# Patient Record
Sex: FEMALE | Race: BLACK OR AFRICAN AMERICAN | Employment: UNEMPLOYED | URBAN - METROPOLITAN AREA
[De-identification: names, ages, dates, MRNs, and addresses within clinical notes are randomized per-mention and may not be internally consistent; named-entity substitution may affect disease eponyms.]

---
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
## 2020-05-25 ENCOUNTER — HOSPITAL ENCOUNTER (EMERGENCY)
Age: 20
Discharge: HOME | End: 2020-05-25
Attending: EMERGENCY MEDICINE
Payer: MEDICAID

## 2020-05-25 VITALS
WEIGHT: 215 LBS | SYSTOLIC BLOOD PRESSURE: 119 MMHG | HEART RATE: 89 BPM | OXYGEN SATURATION: 98 % | RESPIRATION RATE: 16 BRPM | BODY MASS INDEX: 42.21 KG/M2 | TEMPERATURE: 98.7 F | DIASTOLIC BLOOD PRESSURE: 88 MMHG | HEIGHT: 60 IN

## 2020-05-25 DIAGNOSIS — L02.31 ABSCESS OF RIGHT BUTTOCK: Primary | ICD-10-CM

## 2020-05-25 PROCEDURE — 99283 EMERGENCY DEPT VISIT LOW MDM: CPT

## 2020-05-25 RX ORDER — SULFAMETHOXAZOLE AND TRIMETHOPRIM 800; 160 MG/1; MG/1
1 TABLET ORAL 2 TIMES DAILY
Qty: 14 TAB | Refills: 0 | Status: SHIPPED | OUTPATIENT
Start: 2020-05-25 | End: 2020-06-01

## 2020-05-25 NOTE — DISCHARGE INSTRUCTIONS

## 2020-05-25 NOTE — ED TRIAGE NOTES
Pt ambulatory to triage wearing mask. Pt reports abscess to right groin area. Pt states it popped and drained last but still reports it being large and painful. Pt also reports a second one under her stomach. Pt reports hx of abscesses.

## 2020-05-25 NOTE — ED NOTES
I have reviewed discharge instructions with the patient. The patient verbalized understanding. Patient left ED via Discharge Method: ambulatory to Home with self. Opportunity for questions and clarification provided. Patient given 1 scripts. No e-sign. To continue your aftercare when you leave the hospital, you may receive an automated call from our care team to check in on how you are doing. This is a free service and part of our promise to provide the best care and service to meet your aftercare needs.  If you have questions, or wish to unsubscribe from this service please call [phone]. Thank you for Choosing our [facility] Emergency Department.

## 2020-05-25 NOTE — ED PROVIDER NOTES
80-year-old -American female with history of abscesses presents with suspected abscess inner aspect of her right buttock. She states she first noticed it 2 or 3 days ago. It is already draining. Subjective fever but no measured temperature. No vomiting. The history is provided by the patient. Abscess           Past Medical History:   Diagnosis Date    Ill-defined condition     skin condtion       Past Surgical History:   Procedure Laterality Date    HX GI      renasan with 2 G-tubes-removed         No family history on file.     Social History     Socioeconomic History    Marital status: SINGLE     Spouse name: Not on file    Number of children: Not on file    Years of education: Not on file    Highest education level: Not on file   Occupational History    Not on file   Social Needs    Financial resource strain: Not on file    Food insecurity     Worry: Not on file     Inability: Not on file    Transportation needs     Medical: Not on file     Non-medical: Not on file   Tobacco Use    Smoking status: Never Smoker   Substance and Sexual Activity    Alcohol use: No    Drug use: Not on file    Sexual activity: Not on file   Lifestyle    Physical activity     Days per week: Not on file     Minutes per session: Not on file    Stress: Not on file   Relationships    Social connections     Talks on phone: Not on file     Gets together: Not on file     Attends Taoist service: Not on file     Active member of club or organization: Not on file     Attends meetings of clubs or organizations: Not on file     Relationship status: Not on file    Intimate partner violence     Fear of current or ex partner: Not on file     Emotionally abused: Not on file     Physically abused: Not on file     Forced sexual activity: Not on file   Other Topics Concern    Not on file   Social History Narrative    Not on file         ALLERGIES: Latex and Petrolatum [white petrolatum]    Review of Systems Constitutional: Positive for fever. Respiratory: Negative for shortness of breath. Gastrointestinal: Negative for vomiting. Skin: Positive for rash. Neurological: Negative for headaches. Vitals:    05/25/20 1256   BP: 119/88   Pulse: 89   Resp: 16   Temp: 98.7 °F (37.1 °C)   SpO2: 98%   Weight: 97.5 kg (215 lb)   Height: 5' (1.524 m)            Physical Exam  Vitals signs and nursing note reviewed. Constitutional:       General: She is not in acute distress. Appearance: Normal appearance. She is not toxic-appearing. HENT:      Head: Normocephalic. Mouth/Throat:      Mouth: Mucous membranes are moist.   Neck:      Musculoskeletal: Normal range of motion. Cardiovascular:      Rate and Rhythm: Normal rate. Pulmonary:      Effort: Pulmonary effort is normal.   Skin:     General: Skin is warm and dry. Comments: Inner aspect of the right buttock has a small opening with spontaneous purulent drainage. There is no palpable fluid collection. Neurological:      Mental Status: She is alert. Psychiatric:         Mood and Affect: Mood normal.         Behavior: Behavior normal.          MDM  Number of Diagnoses or Management Options  Diagnosis management comments: Patient does have a abscess but it appears to be spontaneously draining therefore I&D not needed. Will place on Bactrim.     Risk of Complications, Morbidity, and/or Mortality  Presenting problems: low  Diagnostic procedures: low  Management options: low           Procedures

## 2021-01-04 ENCOUNTER — HOSPITAL ENCOUNTER (EMERGENCY)
Age: 21
Discharge: LEFT BEFORE DISCHARGE | End: 2021-01-04
Attending: EMERGENCY MEDICINE
Payer: MEDICAID

## 2021-01-04 VITALS
HEIGHT: 60 IN | HEART RATE: 88 BPM | OXYGEN SATURATION: 97 % | WEIGHT: 200 LBS | RESPIRATION RATE: 18 BRPM | TEMPERATURE: 98.1 F | BODY MASS INDEX: 39.27 KG/M2 | DIASTOLIC BLOOD PRESSURE: 76 MMHG | SYSTOLIC BLOOD PRESSURE: 126 MMHG

## 2021-01-04 PROCEDURE — 75810000275 HC EMERGENCY DEPT VISIT NO LEVEL OF CARE

## 2021-01-04 NOTE — ED NOTES
md has not been able to find pt in room and sitter reports pt has not been seen in room since assessment by this rn. Charge rn notified.

## 2021-01-04 NOTE — ED TRIAGE NOTES
Pt arrived via POV wearing a mask c/o \"knot\" on the right and left side of her groin area X3 days. Reports hx of same.  Denies fever, NVD, urinary s/s, shob, cp, meds pta

## 2022-01-03 PROBLEM — H52.223 MYOPIA OF BOTH EYES WITH REGULAR ASTIGMATISM: Status: ACTIVE | Noted: 2017-12-22

## 2022-01-03 PROBLEM — H52.13 MYOPIA OF BOTH EYES WITH REGULAR ASTIGMATISM: Status: ACTIVE | Noted: 2017-12-22

## 2022-01-03 PROBLEM — H53.023: Status: ACTIVE | Noted: 2017-12-22

## 2022-01-03 PROBLEM — E55.9 VITAMIN D DEFICIENCY: Status: ACTIVE | Noted: 2018-10-12

## 2022-01-03 PROBLEM — H53.8 OTHER VISUAL DISTURBANCES: Status: ACTIVE | Noted: 2017-12-22

## 2022-03-19 PROBLEM — H52.13 MYOPIA OF BOTH EYES WITH REGULAR ASTIGMATISM: Status: ACTIVE | Noted: 2017-12-22

## 2022-03-19 PROBLEM — H53.8 OTHER VISUAL DISTURBANCES: Status: ACTIVE | Noted: 2017-12-22

## 2022-03-19 PROBLEM — H53.023: Status: ACTIVE | Noted: 2017-12-22

## 2022-03-19 PROBLEM — H52.223 MYOPIA OF BOTH EYES WITH REGULAR ASTIGMATISM: Status: ACTIVE | Noted: 2017-12-22

## 2022-03-19 PROBLEM — E55.9 VITAMIN D DEFICIENCY: Status: ACTIVE | Noted: 2018-10-12

## 2022-03-24 NOTE — H&P (VIEW-ONLY)
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 Proceed with Achilles lengthening. Popliteal saphenous block by anesthesia -supine. CC: Follow-up (Pre op Surgery is next Wednesday March 30thmm she states she rolled her ankle 2 days ago no treatment )       HPI: Frantz George is a 24 y.o. female who presents with Follow-up (Pre op Surgery is next Wednesday March 30thmm she states she rolled her ankle 2 days ago no treatment )  . She states for a long period of her life she did not treat her bullosa appropriately, specifically the plantar aspect of her left foot. She has systemic skin lesions. Most severe in the plantar aspect of her left foot. This was resulted in her toe walking for multiple years. It is now causing anterior ankle pain along with an Achilles contracture. She is somewhat partner Dr. Tommy Cannon who tried to treat her conservatively with physical therapy. However therapy is increasing her plantar pain. The boot that she was in was irritating her skin so the dermatologist recommended she not be in the boot. We had discussed surgery however I want to get more input from wound care and her dermatologist.  I have since spoken with a dermatologist.  It is outlined in the bottom of the note. She recently rolled her ankle and wants me to evaluate it as well. This ankle rolling is a new injury. History was obtained by patient    ROS/Meds/PSH/PMH/FH/SH: I personally reviewed the patients standard intake form. Below are the pertinents    Tobacco:  reports that she has never smoked. She has never used smokeless tobacco.  Diabetes: None No results found for: HBA1C, UQS2MAMX, LGC8PXSV, [id number]    Other: Epidermolysis bullosa    Physical Examination:  left lower: 2+ dp. +silt s/s/sp/dp/t. 5/5 strength to FHL/EHL/FDL/EDL/AT/PT/Nighat/Achilles. They are TTP at plantar aspect of her foot.   She is also tender to palpation at the distal fibula region. Obvious plantar wounds multiple in the bottom aspect. None of them full-thickness. Most of them of healed or scabbed but there are couple that a small granulating base. .  She also has some tenderness in the front of her ankle. She has a melody Achilles contracture. She is able to dorsiflex her ankle at approximately 15 degrees of plantarflexion. She is unable to the neutral.  This is not improved with knee flexion or extension. Imaging:   I independently interpreted XR taken today    X-Ray LEFT Ankle 3 vw (AP/Lateral/Oblique) for ankle pain   Findings: No signs of acute fractures or dislocations. There is a mild amount of hindfoot valgus noted. Some mild dysmorphic features noted throughout the hindfoot. Heel does not touch the ground she stands with equinus. Second hammertoe noted. Impression: Equinus with second hammertoe   Signature: Yaya Beard MD         Assessment:   Left Achilles contracture    Plan:   4 This is a chronic illness/condition with exacerbation and progression  Treatment at this time: Elective major surgery with procedural risk factors    At her last appointment we had a long discussion about wound healing, chronic wounds, and issues with her epidermolysis bullosa. She recounts and understands the entire discussion as documented on prior notes. She like to proceed with surgery. Surgery will be left Achilles lengthening. I have explained the risk of Achilles rupture, wound healing complications, chronic pain, and recontracture. She understands the risk and elected to proceed with surgery. She understands that she will splint her foot. She cannot remove it. That I will see her back 1 week out from surgery and begin local wound care at that time. She understands this and the nonweightbearing restrictions after surgery. I discussed with her dermatologist Dr. Tatianna Guevara the risk.   He states that she will be fine from a healing standpoint. He recommends that we use gentamicin ointment. Therefore I will place gentamicin ointment on the wounds postoperatively and I will be in charge of dressing changes and wound care with gentamicin ointment. Do the gentamicin helping a mutation transcription, we do recommend that it be used.

## 2022-03-28 RX ORDER — DOXYCYCLINE 100 MG/1
100 CAPSULE ORAL 2 TIMES DAILY
COMMUNITY
Start: 2022-03-03

## 2022-03-28 RX ORDER — MUPIROCIN 20 MG/G
OINTMENT TOPICAL
COMMUNITY
Start: 2022-02-01

## 2022-03-28 RX ORDER — DEXAMETHASONE 6 MG/1
TABLET ORAL
COMMUNITY
Start: 2022-02-11

## 2022-03-28 RX ORDER — BROMPHENIRAMINE MALEATE, PSEUDOEPHEDRINE HYDROCHLORIDE, AND DEXTROMETHORPHAN HYDROBROMIDE 2; 30; 10 MG/5ML; MG/5ML; MG/5ML
SYRUP ORAL
COMMUNITY
Start: 2022-02-11

## 2022-03-29 ENCOUNTER — ANESTHESIA EVENT (OUTPATIENT)
Dept: SURGERY | Age: 22
End: 2022-03-29
Payer: MEDICAID

## 2022-03-30 ENCOUNTER — ANESTHESIA (OUTPATIENT)
Dept: SURGERY | Age: 22
End: 2022-03-30
Payer: MEDICAID

## 2022-03-30 ENCOUNTER — HOSPITAL ENCOUNTER (OUTPATIENT)
Age: 22
Setting detail: OUTPATIENT SURGERY
Discharge: HOME | End: 2022-03-30
Attending: ORTHOPAEDIC SURGERY | Admitting: ORTHOPAEDIC SURGERY
Payer: MEDICAID

## 2022-03-30 VITALS
RESPIRATION RATE: 16 BRPM | SYSTOLIC BLOOD PRESSURE: 111 MMHG | DIASTOLIC BLOOD PRESSURE: 67 MMHG | WEIGHT: 230 LBS | OXYGEN SATURATION: 99 % | HEART RATE: 68 BPM | BODY MASS INDEX: 44.92 KG/M2 | TEMPERATURE: 99 F

## 2022-03-30 DIAGNOSIS — M67.02 CONTRACTURE OF LEFT ACHILLES TENDON: ICD-10-CM

## 2022-03-30 LAB — HCG UR QL: NEGATIVE

## 2022-03-30 PROCEDURE — 74011000250 HC RX REV CODE- 250: Performed by: REGISTERED NURSE

## 2022-03-30 PROCEDURE — 74011250636 HC RX REV CODE- 250/636: Performed by: REGISTERED NURSE

## 2022-03-30 PROCEDURE — 76210000063 HC OR PH I REC FIRST 0.5 HR: Performed by: ORTHOPAEDIC SURGERY

## 2022-03-30 PROCEDURE — 76942 ECHO GUIDE FOR BIOPSY: CPT | Performed by: ORTHOPAEDIC SURGERY

## 2022-03-30 PROCEDURE — 74011250637 HC RX REV CODE- 250/637: Performed by: ANESTHESIOLOGY

## 2022-03-30 PROCEDURE — 76010010054 HC POST OP PAIN BLOCK: Performed by: ORTHOPAEDIC SURGERY

## 2022-03-30 PROCEDURE — 74011250636 HC RX REV CODE- 250/636: Performed by: ORTHOPAEDIC SURGERY

## 2022-03-30 PROCEDURE — 77030003602 HC NDL NRV BLK BBMI -B: Performed by: ANESTHESIOLOGY

## 2022-03-30 PROCEDURE — 81025 URINE PREGNANCY TEST: CPT

## 2022-03-30 PROCEDURE — 74011250636 HC RX REV CODE- 250/636: Performed by: ANESTHESIOLOGY

## 2022-03-30 PROCEDURE — 27685 REVISION OF LOWER LEG TENDON: CPT | Performed by: ORTHOPAEDIC SURGERY

## 2022-03-30 PROCEDURE — 76210000020 HC REC RM PH II FIRST 0.5 HR: Performed by: ORTHOPAEDIC SURGERY

## 2022-03-30 PROCEDURE — 2709999900 HC NON-CHARGEABLE SUPPLY: Performed by: ORTHOPAEDIC SURGERY

## 2022-03-30 PROCEDURE — 77030000032 HC CUF TRNQT ZIMM -B: Performed by: ORTHOPAEDIC SURGERY

## 2022-03-30 PROCEDURE — 76010000159 HC OR TIME FIRST 0.5 HR INTENSV-TIER 1: Performed by: ORTHOPAEDIC SURGERY

## 2022-03-30 PROCEDURE — 77030025281 HC SPLNT ORTHGLS 1 BSNM -B: Performed by: ORTHOPAEDIC SURGERY

## 2022-03-30 PROCEDURE — 76060000032 HC ANESTHESIA 0.5 TO 1 HR: Performed by: ORTHOPAEDIC SURGERY

## 2022-03-30 RX ORDER — SODIUM CHLORIDE, SODIUM LACTATE, POTASSIUM CHLORIDE, CALCIUM CHLORIDE 600; 310; 30; 20 MG/100ML; MG/100ML; MG/100ML; MG/100ML
75 INJECTION, SOLUTION INTRAVENOUS CONTINUOUS
Status: DISCONTINUED | OUTPATIENT
Start: 2022-03-30 | End: 2022-03-30 | Stop reason: HOSPADM

## 2022-03-30 RX ORDER — LIDOCAINE HYDROCHLORIDE 10 MG/ML
0.1 INJECTION INFILTRATION; PERINEURAL AS NEEDED
Status: DISCONTINUED | OUTPATIENT
Start: 2022-03-30 | End: 2022-03-30 | Stop reason: HOSPADM

## 2022-03-30 RX ORDER — FLUMAZENIL 0.1 MG/ML
0.2 INJECTION INTRAVENOUS
Status: DISCONTINUED | OUTPATIENT
Start: 2022-03-30 | End: 2022-03-30 | Stop reason: HOSPADM

## 2022-03-30 RX ORDER — FENTANYL CITRATE 50 UG/ML
100 INJECTION, SOLUTION INTRAMUSCULAR; INTRAVENOUS
Status: COMPLETED | OUTPATIENT
Start: 2022-03-30 | End: 2022-03-30

## 2022-03-30 RX ORDER — CEFAZOLIN SODIUM/WATER 2 G/20 ML
2 SYRINGE (ML) INTRAVENOUS ONCE
Status: COMPLETED | OUTPATIENT
Start: 2022-03-30 | End: 2022-03-30

## 2022-03-30 RX ORDER — ACETAMINOPHEN 500 MG
1000 TABLET ORAL ONCE
Status: COMPLETED | OUTPATIENT
Start: 2022-03-30 | End: 2022-03-30

## 2022-03-30 RX ORDER — ONDANSETRON 2 MG/ML
INJECTION INTRAMUSCULAR; INTRAVENOUS AS NEEDED
Status: DISCONTINUED | OUTPATIENT
Start: 2022-03-30 | End: 2022-03-30 | Stop reason: HOSPADM

## 2022-03-30 RX ORDER — MIDAZOLAM HYDROCHLORIDE 1 MG/ML
2 INJECTION, SOLUTION INTRAMUSCULAR; INTRAVENOUS
Status: COMPLETED | OUTPATIENT
Start: 2022-03-30 | End: 2022-03-30

## 2022-03-30 RX ORDER — PROPOFOL 10 MG/ML
INJECTION, EMULSION INTRAVENOUS AS NEEDED
Status: DISCONTINUED | OUTPATIENT
Start: 2022-03-30 | End: 2022-03-30 | Stop reason: HOSPADM

## 2022-03-30 RX ORDER — SODIUM CHLORIDE, SODIUM LACTATE, POTASSIUM CHLORIDE, CALCIUM CHLORIDE 600; 310; 30; 20 MG/100ML; MG/100ML; MG/100ML; MG/100ML
100 INJECTION, SOLUTION INTRAVENOUS CONTINUOUS
Status: DISCONTINUED | OUTPATIENT
Start: 2022-03-30 | End: 2022-03-30 | Stop reason: HOSPADM

## 2022-03-30 RX ORDER — SODIUM CHLORIDE 0.9 % (FLUSH) 0.9 %
5-40 SYRINGE (ML) INJECTION EVERY 8 HOURS
Status: DISCONTINUED | OUTPATIENT
Start: 2022-03-30 | End: 2022-03-30 | Stop reason: HOSPADM

## 2022-03-30 RX ORDER — LIDOCAINE HYDROCHLORIDE 20 MG/ML
INJECTION, SOLUTION EPIDURAL; INFILTRATION; INTRACAUDAL; PERINEURAL AS NEEDED
Status: DISCONTINUED | OUTPATIENT
Start: 2022-03-30 | End: 2022-03-30 | Stop reason: HOSPADM

## 2022-03-30 RX ORDER — OXYCODONE HYDROCHLORIDE 5 MG/1
5 TABLET ORAL
Status: COMPLETED | OUTPATIENT
Start: 2022-03-30 | End: 2022-03-30

## 2022-03-30 RX ORDER — NALOXONE HYDROCHLORIDE 0.4 MG/ML
0.1 INJECTION, SOLUTION INTRAMUSCULAR; INTRAVENOUS; SUBCUTANEOUS
Status: DISCONTINUED | OUTPATIENT
Start: 2022-03-30 | End: 2022-03-30 | Stop reason: HOSPADM

## 2022-03-30 RX ORDER — HALOPERIDOL 5 MG/ML
1 INJECTION INTRAMUSCULAR
Status: DISCONTINUED | OUTPATIENT
Start: 2022-03-30 | End: 2022-03-30 | Stop reason: HOSPADM

## 2022-03-30 RX ORDER — DIPHENHYDRAMINE HYDROCHLORIDE 50 MG/ML
12.5 INJECTION, SOLUTION INTRAMUSCULAR; INTRAVENOUS
Status: DISCONTINUED | OUTPATIENT
Start: 2022-03-30 | End: 2022-03-30 | Stop reason: HOSPADM

## 2022-03-30 RX ORDER — HYDROMORPHONE HYDROCHLORIDE 2 MG/ML
0.5 INJECTION, SOLUTION INTRAMUSCULAR; INTRAVENOUS; SUBCUTANEOUS
Status: DISCONTINUED | OUTPATIENT
Start: 2022-03-30 | End: 2022-03-30 | Stop reason: HOSPADM

## 2022-03-30 RX ORDER — SODIUM CHLORIDE 0.9 % (FLUSH) 0.9 %
5-40 SYRINGE (ML) INJECTION AS NEEDED
Status: DISCONTINUED | OUTPATIENT
Start: 2022-03-30 | End: 2022-03-30 | Stop reason: HOSPADM

## 2022-03-30 RX ORDER — PROPOFOL 10 MG/ML
INJECTION, EMULSION INTRAVENOUS
Status: DISCONTINUED | OUTPATIENT
Start: 2022-03-30 | End: 2022-03-30 | Stop reason: HOSPADM

## 2022-03-30 RX ADMIN — ONDANSETRON 4 MG: 2 INJECTION INTRAMUSCULAR; INTRAVENOUS at 07:22

## 2022-03-30 RX ADMIN — FENTANYL CITRATE 100 MCG: 50 INJECTION INTRAMUSCULAR; INTRAVENOUS at 06:49

## 2022-03-30 RX ADMIN — ACETAMINOPHEN 1000 MG: 500 TABLET, FILM COATED ORAL at 05:53

## 2022-03-30 RX ADMIN — ROPIVACAINE HYDROCHLORIDE 22 ML: 5 INJECTION, SOLUTION EPIDURAL; INFILTRATION; PERINEURAL at 06:45

## 2022-03-30 RX ADMIN — MIDAZOLAM 2 MG: 1 INJECTION INTRAMUSCULAR; INTRAVENOUS at 06:49

## 2022-03-30 RX ADMIN — MIDAZOLAM 2 MG: 1 INJECTION INTRAMUSCULAR; INTRAVENOUS at 06:55

## 2022-03-30 RX ADMIN — EPINEPHRINE 5 ML: 1 INJECTION, SOLUTION, CONCENTRATE INTRAVENOUS at 06:50

## 2022-03-30 RX ADMIN — Medication 2 G: at 07:24

## 2022-03-30 RX ADMIN — PROPOFOL 50 MG: 10 INJECTION, EMULSION INTRAVENOUS at 07:22

## 2022-03-30 RX ADMIN — LIDOCAINE HYDROCHLORIDE 60 MG: 20 INJECTION, SOLUTION EPIDURAL; INFILTRATION; INTRACAUDAL; PERINEURAL at 07:22

## 2022-03-30 RX ADMIN — OXYCODONE 5 MG: 5 TABLET ORAL at 08:56

## 2022-03-30 RX ADMIN — EPINEPHRINE 8 ML: 1 INJECTION, SOLUTION, CONCENTRATE INTRAVENOUS at 06:45

## 2022-03-30 RX ADMIN — PROPOFOL 120 MCG/KG/MIN: 10 INJECTION, EMULSION INTRAVENOUS at 07:22

## 2022-03-30 RX ADMIN — ROPIVACAINE HYDROCHLORIDE 15 ML: 5 INJECTION, SOLUTION EPIDURAL; INFILTRATION; PERINEURAL at 06:50

## 2022-03-30 RX ADMIN — SODIUM CHLORIDE, POTASSIUM CHLORIDE, SODIUM LACTATE AND CALCIUM CHLORIDE 100 ML/HR: 600; 310; 30; 20 INJECTION, SOLUTION INTRAVENOUS at 06:01

## 2022-03-30 NOTE — INTERVAL H&P NOTE
Update History & Physical    The Patient's History and Physical was reviewed   I discussed the surgery and patients medical condition with the patient. The chart was reviewed with the patient and I examined the patient. There was no change. The surgical site was confirmed by the patient and me. CV: RRR  RESP: CTAB    Plan:  The risk, benefits, expected outcome, and alternative to the recommended procedure have been discussed with the patient. Patient understands and wants to proceed with the procedure.     Electronically signed by Elizabeth Pierce MD on 03/30/22 6:58 AM

## 2022-03-30 NOTE — ANESTHESIA PREPROCEDURE EVALUATION
Anesthetic History   No history of anesthetic complications            Review of Systems / Medical History  Patient summary reviewed and pertinent labs reviewed    Pulmonary            Asthma : well controlled       Neuro/Psych         Psychiatric history (Depression)     Cardiovascular    Hypertension              Exercise tolerance: >4 METS     GI/Hepatic/Renal     GERD: well controlled           Endo/Other        Morbid obesity     Other Findings   Comments: Chronic Pain  Epidermolysis bullosa           Physical Exam    Airway  Mallampati: III  TM Distance: > 6 cm  Neck ROM: normal range of motion   Mouth opening: Normal     Cardiovascular  Regular rate and rhythm,  S1 and S2 normal,  no murmur, click, rub, or gallop          Pertinent negatives: No murmur   Dental    Dentition: Poor dentition     Pulmonary  Breath sounds clear to auscultation               Abdominal  GI exam deferred       Other Findings            Anesthetic Plan    ASA: 3  Anesthesia type: total IV anesthesia      Post-op pain plan if not by surgeon: peripheral nerve block single    Induction: Intravenous  Anesthetic plan and risks discussed with: Patient and Family

## 2022-03-30 NOTE — NURSE NAVIGATOR
Pt received from OR on NC. Tolerating RA. Mitchel [place] at bedside, discharge instructions reviewed and questions answered. Medicated for pain x1. VSS. No further needs at this time.

## 2022-03-30 NOTE — ANESTHESIA PROCEDURE NOTES
Peripheral Block    Start time: 3/30/2022 6:42 AM  End time: 3/30/2022 6:45 AM  Performed by: Barby Hauser MD  Authorized by: Barby Hauser MD       Pre-procedure: Indications: at surgeon's request and post-op pain management    Preanesthetic Checklist: patient identified, risks and benefits discussed, site marked, timeout performed, anesthesia consent given and patient being monitored    Timeout Time: 06:41 EDT          Block Type:   Block Type:  Popliteal  Laterality:  Left  Monitoring:  Standard ASA monitoring, continuous pulse ox, frequent vital sign checks, heart rate, responsive to questions and oxygen  Injection Technique:  Single shot  Procedures: ultrasound guided and nerve stimulator    Patient Position: supine  Prep: chlorhexidine    Location:  Lower thigh  Needle Type:  Stimuplex  Needle Gauge:  22 G  Needle Localization:  Anatomical landmarks, ultrasound guidance and nerve stimulator  Motor Response comment:   Motor Response: minimal motor response >0.4 mA   Medication Injected:  Ropivacaine 0.5% with epinephrine 1:200,000 injection, 22 mL (Mixture components: EPINEPHrine HCl (PF) 1 mg/mL (1 mL) Soln, . 005 mL; ropivacaine (PF) 5 mg/mL (0.5 %) Soln, 1 mL)  mepivacaine 1.5% with epinephrine 1:200,000 injection, 8 mL (Mixture components: EPINEPHrine HCl (PF) 1 mg/mL (1 mL) Soln, . 005 mL; mepivacaine-pf 15 mg/mL (1.5 %) Soln, 1 mL)  Med Admin Time: 3/30/2022 6:45 AM    Assessment:  Number of attempts:  1  Injection Assessment:  Incremental injection every 5 mL, local visualized surrounding nerve on ultrasound, negative aspiration for CSF, negative aspiration for blood, no paresthesia, no intravascular symptoms and ultrasound image on chart  Patient tolerance:  Patient tolerated the procedure well with no immediate complications

## 2022-03-30 NOTE — DISCHARGE INSTRUCTIONS
INSTRUCTIONS FOLLOWING FOOT SURGERY    ACTIVITY  Elevate foot. No Ice    No weight bearing. Use crutches or knee walker until seen in follow up appointment        Blood clot prevention:    As instructed by Dr Jay Gill: Take 81mg aspirin twice daily if ok with your medical doctor and you have no GI Ulcer. Get up and out of bed frequently. While in bed move the legs as much as possible. DRESSING CARE Keep dry and in place until follow up appointment. Cover with cast bag or plastic bag when showering. Let the office know if dressing gets saturated with water. Don't put anything into the splint to relieve itching etc.     CALL YOUR DOCTOR IF YOU HAVE  Excessive bleeding that does not stop after holding mild pressure over the area. Temperature of 101 degrees or above. Redness, excessive swelling or bruising, and/or green or yellow, smelly discharge from incision. Loss of sensation - cold, white or blue toes. DIET  Day of Surgery: Clear liquids until no nausea or vomiting; then light, bland diet (Baked chicken, plain rice, grits, scrambled egg, toast). Nothing Greasy, fried or spicy today  Advance to regular diet on second day, unless your doctor orders otherwise. PAIN  Take pain medications as directed by your doctor. Call your doctor if pain is NOT relieved by medication. MEDICATION INTERACTION:  During your procedure you potentially received a medication or medications which may reduce the effectiveness of oral contraceptives. Please consider other forms of contraception for 1 month following your procedure if you are currently using oral contraceptives as your primary form of birth control.  In addition to this, we recommend continuing your oral contraceptive as prescribed, unless otherwise instructed by your physician, during this time    After general anesthesia or intravenous sedation, for 24 hours or while taking prescription Narcotics:  · Limit your activities  · A responsible adult needs to be with you for the next 24 hours  · Do not drive and operate hazardous machinery  · Do not make important personal or business decisions  · Do not drink alcoholic beverages  · If you have not urinated within 8 hours after discharge, and you are experiencing discomfort from urinary retention, please go to the nearest ED. · If you have sleep apnea and have a CPAP machine, please use it for all naps and sleeping. · Please use caution when taking narcotics and any of your home medications that may cause drowsiness. *  Please give a list of your current medications to your Primary Care Provider. *  Please update this list whenever your medications are discontinued, doses are      changed, or new medications (including over-the-counter products) are added. *  Please carry medication information at all times in case of emergency situations. These are general instructions for a healthy lifestyle:  No smoking/ No tobacco products/ Avoid exposure to second hand smoke  Surgeon General's Warning:  Quitting smoking now greatly reduces serious risk to your health. Obesity, smoking, and sedentary lifestyle greatly increases your risk for illness  A healthy diet, regular physical exercise & weight monitoring are important for maintaining a healthy lifestyle    You may be retaining fluid if you have a history of heart failure or if you experience any of the following symptoms:  Weight gain of 3 pounds or more overnight or 5 pounds in a week, increased swelling in our hands or feet or shortness of breath while lying flat in bed. Please call your doctor as soon as you notice any of these symptoms; do not wait until your next office visit. Learning About How to Use Crutches  Your Care Instructions  Crutches can help you walk when you have an injured hip, leg, knee, ankle, or foot. Your doctor will tell you how much weight--if any--you can put on your leg. Be sure your crutches fit you.  When you stand up in your normal posture, there should be space for two or three fingers between the top of the crutch and your armpit. When you let your hands hang down, the hand  should be at your wrists. When you put your hands on the hand , your elbows should be slightly bent. To stay safe when using crutches:  · Look straight ahead, not down at your feet. · Clear away small rugs, cords, or anything else that could cause you to trip, slip, or fall. · Be very careful around pets and small children. They can get in your path when you least expect it. · Be sure the rubber tips on your crutches are clean and in good condition to help prevent slipping. · Avoid slick conditions, such as wet floors and snowy or icy driveways. In bad weather, be especially careful on curbs and steps. How to use crutches  Getting ready to walk    1. Bend your elbows slightly. Press the padded top parts of the crutches against your sides, under your armpits. 2. If you have been told not to put any weight on your injured leg, keep that leg bent and off the ground. Walking with crutches    1. Put both crutches about 12 inches in front of you. 2. Put your weight on the handgrips, not on the pads under your arms. (Constant pressure against your underarms can cause numbness.) Swing your body forward. (If you have been told not to put any weight on your injured leg, keep that leg bent and off the ground.)  3. To complete the step, put your weight on the strong leg. 4. Move your crutches about 12 inches in front of you, and start the next step. 5. When you're confident using the crutches, you can move the crutches and your injured leg at the same time. Then push straight down on the crutches as you step past the crutches with your strong leg, as you would in normal walking. 6. Take small steps. 7. Use ramps and elevators when you can. Sitting down    1. To sit, back up to the chair.  Use one hand to hold both crutches by the handgrips, beside your injured leg. With the other hand, hold onto the seat and slowly lower yourself onto the chair. 2. Lay the crutches on the ground near your chair. If you prop them up, they may fall over. Getting up from a chair    1. To get up from a chair,  the crutches and put them in one hand beside your injured leg. 2. Put your weight on the handgrips of the crutches and on your strong leg to stand up. Walking up stairs    1. To go up stairs, step up with your strong leg and then bring the crutches and your injured leg to the upper step. 2. For stairs that have handrails: Put both crutches under the arm opposite the handrail. Use the hand opposite the handrail to hold both crutches by the handgrips. 3. Hold onto the handrail as you go up. Put your strong leg on the step first when you go up. Walking down stairs    1. To go down stairs, put your crutches and injured leg on the lower step. 2. Bring your strong leg to the lower step. This saying may help you remember: \"Up with the good, down with the bad. \"  3. For stairs that have handrails: Put both crutches under the arm opposite the handrail. Use the hand opposite the handrail to hold both crutches by the handgrips. Hold onto the handrail as you go down. Follow the same process you use for stairs: Lead with your crutches and injured leg on the way down.

## 2022-03-30 NOTE — ANESTHESIA PROCEDURE NOTES
Peripheral Block    Start time: 3/30/2022 6:48 AM  End time: 3/30/2022 6:50 AM  Performed by: Mirza Stallings MD  Authorized by: Mirza Stallings MD       Pre-procedure: Indications: at surgeon's request and post-op pain management    Preanesthetic Checklist: patient identified, risks and benefits discussed, site marked, timeout performed, anesthesia consent given and patient being monitored    Timeout Time: 06:46 EDT          Block Type:   Block Type: Adductor canal  Laterality:  Left  Monitoring:  Standard ASA monitoring, continuous pulse ox, frequent vital sign checks, heart rate, responsive to questions and oxygen  Injection Technique:  Single shot  Procedures: ultrasound guided    Patient Position: supine  Prep: chlorhexidine    Location:  Mid thigh  Needle Type:  Stimuplex  Needle Gauge:  22 G  Needle Localization:  Ultrasound guidance  Medication Injected:  Ropivacaine 0.5% with epinephrine 1:200,000 injection, 15 mL (Mixture components: EPINEPHrine HCl (PF) 1 mg/mL (1 mL) Soln, . 005 mL; ropivacaine (PF) 5 mg/mL (0.5 %) Soln, 1 mL)  mepivacaine 1.5% with epinephrine 1:200,000 injection, 5 mL (Mixture components: EPINEPHrine HCl (PF) 1 mg/mL (1 mL) Soln, . 005 mL; mepivacaine-pf 15 mg/mL (1.5 %) Soln, 1 mL)  Med Admin Time: 3/30/2022 6:50 AM    Assessment:  Number of attempts:  1  Injection Assessment:  Incremental injection every 5 mL, local visualized surrounding nerve on ultrasound, negative aspiration for CSF, negative aspiration for blood, no paresthesia, no intravascular symptoms and ultrasound image on chart  Patient tolerance:  Patient tolerated the procedure well with no immediate complications

## 2022-03-30 NOTE — OP NOTES
Operative Note    Robbi Meredith  MRN: [id number]    Date Of Surgery: 3/30/2022    Surgeon: Emilia Hassan MD    Assistant Surgeon: None    Procedure Performed:   Left achilles lengthening    Pre Op Diagnosis:  Left ankle equinus contracture    Post Op Diagnosis:   same    Implants:   * No implants in log *    Anesthesia:  MAC with preoperative popliteal saphenous block    Blood Loss:  -    Tourniquet:  Estimated - minutes    Pre Operative Abx:   Ancef 2g    Specimens/Cultures:  -    Significant Findings:  Healed epidermolysis bullosa blisters on the plantar aspect the foot. No signs of wounds at the Achilles. After release of the Achilles and lengthening it corrected the majority equinus however she still barely get to neutral ankle dorsiflexion. Pre Operative Course:  Alissa Paulson is a 24 y.o. 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 This was resulted in blisters in the plantar aspect of her foot. Therefore she has compensated by walking on her toes for multiple years. This in turn resulted in a equinus contracture. After discussion with her and her dermatologist, the decision was made for percutaneous Achilles lengthening. She understands the risk of increased surgery. Operation In Detail:  Patient was evaluated in the preoperative area. The left lower extremity was marked by me. We had a long discussion about the procedure and postoperative protocols. The patient was then brought back to the operating room suite and placed in the operating room table. A timeout was taken to identify the patient, procedure being performed, and laterality. After this the patient was prepped and draped in the normal sterile fashion using a Betadine solution and/or a ChloraPrep solution. A timeout was then taken to identify the patient his name, date of birth, laterality, and procedure being performed. We also identified allergies and any concerns about the operation.   Attention was then placed to the operative extremity. Antibiotics, 2 g IV Ancef were given    The leg was elevated. The Achilles tendon was identified. The foot was dorsiflexed. Using a 15 blade I made a incision at the mid substance of the Achilles near the insertion. I then took my knife, turned it medially, and casey transected the Achilles tendon. I then worked proximally and then 6 cm above the insertion. Made a separate incision, inserting a knife and mid substance of the Achilles, trunk and laterally, and performed a hemitransection of the Achilles tendon. I then worked approximately 10 cm above the Achilles insertion. I inserted my knife again. In the mid substance. I then hemitransected the medial  Achilles tendon. The Achilles tendon lengthening, and the foot was able to achieve a plantigrade position. I then  irrigated out the wounds. I closed each of them with a 3-0 nylon suture. I then placed gentamicin ointment on the wounds. A sterile dressing was then applied to the leg and Posterior slab splint. They were awoken from anesthesia and returned to the PACU without difficulty.     Post Operative Plan:   1- WB status: Non-Weight Bearing   2- Follow Up: 1 week  3- Immobilization/assistive devices: Crutches and knee scooter as needed  4- DVT px: ASA 81 mg BID  5- Pain Medication: Percocet 5mg/325 q6 PRN pain #30

## 2022-03-30 NOTE — PROGRESS NOTES
's pre-procedure visit and prayer with patient as requested.     Sb Griggs MDiv, BS  Board Certified

## 2022-03-30 NOTE — ANESTHESIA POSTPROCEDURE EVALUATION
Procedure(s):  LEFT ACHILLES LENGTHENING OPEN. total IV anesthesia    Anesthesia Post Evaluation      Multimodal analgesia: multimodal analgesia used between 6 hours prior to anesthesia start to PACU discharge  Patient location during evaluation: bedside  Patient participation: complete - patient participated  Level of consciousness: awake  Pain management: adequate  Airway patency: patent  Anesthetic complications: no  Cardiovascular status: acceptable  Respiratory status: spontaneous ventilation and acceptable  Hydration status: acceptable  Post anesthesia nausea and vomiting:  none      INITIAL Post-op Vital signs:   Vitals Value Taken Time   /70 03/30/22 0815   Temp 37.2 °C (99 °F) 03/30/22 0751   Pulse 63 03/30/22 0816   Resp 16 03/30/22 0756   SpO2 98 % 03/30/22 0816   Vitals shown include unvalidated device data.

## 2022-05-11 ENCOUNTER — HOSPITAL ENCOUNTER (EMERGENCY)
Age: 22
Discharge: HOME | End: 2022-05-11
Attending: EMERGENCY MEDICINE
Payer: MEDICAID

## 2022-05-11 VITALS
WEIGHT: 220 LBS | TEMPERATURE: 98.8 F | BODY MASS INDEX: 43.19 KG/M2 | HEIGHT: 60 IN | SYSTOLIC BLOOD PRESSURE: 147 MMHG | RESPIRATION RATE: 16 BRPM | DIASTOLIC BLOOD PRESSURE: 110 MMHG | HEART RATE: 60 BPM | OXYGEN SATURATION: 100 %

## 2022-05-11 DIAGNOSIS — L02.91 ABSCESS: Primary | ICD-10-CM

## 2022-05-11 PROCEDURE — 99283 EMERGENCY DEPT VISIT LOW MDM: CPT

## 2022-05-11 RX ORDER — AMOXICILLIN AND CLAVULANATE POTASSIUM 875; 125 MG/1; MG/1
1 TABLET, FILM COATED ORAL 2 TIMES DAILY
Qty: 14 TABLET | Refills: 0 | Status: SHIPPED | OUTPATIENT
Start: 2022-05-11

## 2022-05-11 NOTE — ED NOTES
26 yo female presenting today complaining of abscess to the right inner thigh that has been present for a week. States it started draining yesterday. Denies any fevers or chills. VSS. NAD. Patient evaluated initially in triage. Rapid Medical Evaluation was conducted and necessary orders have been placed. I have performed a medical screening exam.  Care will now be transferred to the provider in the back of the emergency department.   PETR Rodriguez 1:45 PM

## 2022-05-11 NOTE — ED PROVIDER NOTES
49-year-old female with a history of asthma, depression, hypertension, and obesity who presents emergency department today with complaint of an abscess to her right inner thigh. Patient states that she noticed it about a week ago and has progressively worsened. She reports a history of abscesses and states that she typically has to get placed on antibiotic. She states that she had 1 under her left arm about a month ago and took Bactrim for this. She denies any fever, chills, nausea, vomiting, diarrhea, chest pain, abdominal pain, or shortness of breath. She reports that the area is draining but denies any treatment. The history is provided by the patient. Abscess  This is a new problem. The current episode started more than 2 days ago. The problem occurs constantly. The problem has been gradually worsening. Pertinent negatives include no chest pain, no abdominal pain, no headaches and no shortness of breath. Nothing aggravates the symptoms. Nothing relieves the symptoms. She has tried nothing for the symptoms. Past Medical History:   Diagnosis Date    Asthma     uses inhalers     Chronic pain     Contracture of left Achilles tendon     Depression     Epidermolysis bullosa     GERD (gastroesophageal reflux disease)     managed with prilosec     Hypertension     managed with medication     Ill-defined condition     skin condtion    Morbid obesity ([address]     Seasonal allergic rhinitis     Vitamin D deficiency        Past Surgical History:   Procedure Laterality Date    HX GI      nissen with 2 G-tubes-removed         No family history on file.     Social History     Socioeconomic History    Marital status: SINGLE     Spouse name: Not on file    Number of children: Not on file    Years of education: Not on file    Highest education level: Not on file   Occupational History    Not on file   Tobacco Use    Smoking status: Never Smoker    Smokeless tobacco: Never Used   Vaping Use    Vaping Use: Former   Substance and Sexual Activity    Alcohol use: Not Currently    Drug use: Never    Sexual activity: Never   Other Topics Concern    Not on file   Social History Narrative    Not on file     Social Determinants of Health     Financial Resource Strain:     Difficulty of Paying Living Expenses: Not on file   Food Insecurity:     Worried About Running Out of Food in the Last Year: Not on file    Irene of Food in the Last Year: Not on file   Transportation Needs:     Lack of Transportation (Medical): Not on file    Lack of Transportation (Non-Medical): Not on file   Physical Activity:     Days of Exercise per Week: Not on file    Minutes of Exercise per Session: Not on file   Stress:     Feeling of Stress : Not on file   Social Connections:     Frequency of Communication with Friends and Family: Not on file    Frequency of Social Gatherings with Friends and Family: Not on file    Attends Druze Services: Not on file    Active Member of [address] [place] [address] or Organizations: Not on file    Attends Club or Organization Meetings: Not on file    Marital Status: Not on file   Intimate Partner Violence:     Fear of Current or Ex-Partner: Not on file    Emotionally Abused: Not on file    Physically Abused: Not on file    Sexually Abused: Not on file   Housing Stability:     Unable to Pay for Housing in the Last Year: Not on file    Number of [place] in the Last Year: Not on file    Unstable Housing in the Last Year: Not on file         ALLERGIES: Latex and Petrolatum [white petrolatum]    Review of Systems   Constitutional: Negative for fever. Respiratory: Negative for shortness of breath. Cardiovascular: Negative for chest pain. Gastrointestinal: Negative for abdominal pain. Skin: Positive for wound. Neurological: Negative for headaches. All other systems reviewed and are negative.       Vitals:    05/11/22 1343   BP: (!) 147/110   Pulse: 60   Resp: 16   Temp: 98.8 °F (37.1 °C)   SpO2: 100%   Weight: 99.8 kg (220 lb)   Height: 5' (1.524 m)            Physical Exam  Vitals and nursing note reviewed. Constitutional:       General: She is not in acute distress. Appearance: Normal appearance. She is not ill-appearing, toxic-appearing or diaphoretic. HENT:      Head: Normocephalic and atraumatic. Right Ear: External ear normal.      Left Ear: External ear normal.   Eyes:      General: No scleral icterus. Extraocular Movements: Extraocular movements intact. Conjunctiva/sclera: Conjunctivae normal.   Cardiovascular:      Rate and Rhythm: Normal rate. Pulmonary:      Effort: Pulmonary effort is normal. No respiratory distress. Abdominal:      General: Abdomen is flat. There is no distension. Musculoskeletal:         General: Normal range of motion. Cervical back: Normal range of motion and neck supple. No rigidity. Skin:     General: Skin is warm and dry. Capillary Refill: Capillary refill takes less than 2 seconds. Findings: Abscess present. Comments: Approximately 0.5 cm abscess to right inner thigh with approx 3 cm area of indurated skin surrounding abscess with erythema. Purulent drainage noted. Neurological:      General: No focal deficit present. Mental Status: She is alert and oriented to person, place, and time. Psychiatric:         Mood and Affect: Mood normal.         Behavior: Behavior normal.         Thought Content: Thought content normal.         Judgment: Judgment normal.          MDM  Number of Diagnoses or Management Options  Abscess: new and does not require workup  Diagnosis management comments: 20-year-old female with a history of asthma, depression, hypertension, and obesity who presents emergency department today with complaint of an abscess to her right inner thigh. Patient is not toxic or acutely ill-appearing on exam.  She does have an abscess to the right inner thigh.   Area is open and draining purulent drainage. It appears to be draining well so I do not see the need to open any further with an incision. Will discharge on antibiotics. Wound care discussed. I have discussed the results of all labs, procedures, radiographs, and/or treatments with the patient and available family members. Héctor Thomas is agreed upon by the patient and the patient is ready for discharge.  Questions about treatment in the ED and differential diagnosis of presenting condition were answered. Leigh Ann Ferris was given verbal discharge instructions including, but not limited to, importance of returning to the emergency department for any concern of worsening or continued symptoms.  Instructions were given to follow up with a primary care provider or specialist within 1-2 days. Toribio Litten effects of medications, if prescribed, were discussed and patient was advised to refrain from significant physical activity until followed up by primary care physician and to not drive or operate heavy machinery after taking any sedating substances.      Linda Castellano NP; 5/11/2022 @2:48 PM Voice dictation software was used during the making of  this note. This software is not perfect and grammatical and other typographical errors  may be present. This note has not been proofread for errors.       Risk of Complications, Morbidity, and/or Mortality  Presenting problems: low  Diagnostic procedures: low  Management options: low    Patient Progress  Patient progress: improved         Procedures

## 2022-05-11 NOTE — ED TRIAGE NOTES
Patient ambulatory to triage with mask in place. Patient reports abscess to right inner thigh x 1 week. Pt reports it is currently draining.

## 2022-05-11 NOTE — ED NOTES
I have reviewed discharge instructions with the patient. The patient verbalized understanding. Patient left ED via Discharge Method: ambulatory to Home with self. Opportunity for questions and clarification provided. Patient given 0 scripts. To continue your aftercare when you leave the hospital, you may receive an automated call from our care team to check in on how you are doing. This is a free service and part of our promise to provide the best care and service to meet your aftercare needs.  If you have questions, or wish to unsubscribe from this service please call [phone]. Thank you for Choosing our [facility] Emergency Department.

## 2022-05-31 ENCOUNTER — OFFICE VISIT (OUTPATIENT)
Dept: ORTHOPEDIC SURGERY | Age: 22
End: 2022-05-31
Payer: MEDICAID

## 2022-05-31 VITALS — BODY MASS INDEX: 43.19 KG/M2 | HEIGHT: 60 IN | WEIGHT: 220 LBS

## 2022-05-31 DIAGNOSIS — M15.8 DEGENERATIVE ARTHRITIS OF INTERPHALANGEAL JOINT OF RIGHT THUMB: ICD-10-CM

## 2022-05-31 DIAGNOSIS — M15.8 DEGENERATIVE ARTHRITIS OF INTERPHALANGEAL JOINT OF RIGHT THUMB: Primary | ICD-10-CM

## 2022-05-31 DIAGNOSIS — M79.644 THUMB PAIN, RIGHT: Primary | ICD-10-CM

## 2022-05-31 PROCEDURE — 99204 OFFICE O/P NEW MOD 45 MIN: CPT | Performed by: ORTHOPAEDIC SURGERY

## 2022-05-31 NOTE — PROGRESS NOTES
Orthopaedic Hand Clinic Note    Name: Jose Matrin Michael  YOB: 2000  Gender: female  MRN: [id number]      CC: Patient referred for evaluation of upper extremity pain    HPI: Jose Martin Michael is a 25 y.o. female with a chief complaint of right thumb pain and deformity which has been present since soon after birth. The patient has a history of epidermolysis bullosa, and as a result she says her right hand had to be bandaged when she was a baby, and her thumb has grown incorrectly since then. She has thumb pain and difficulty using it. .      ROS/Meds/PSH/PMH/FH/SH: I personally reviewed the patients standard intake form. Pertinents are discussed in the HPI    Physical Examination:    Musculoskeletal Exam:  Examination on the right upper extremity demonstrates cap refill < 5 seconds in all fingers, skin is intact. There is callusing and a atrophy of the thumb volar pad. There is a 90 degree radial deviation deformity at the thumb IP joint, which is able to be passively corrected. She has no pain at the  or [facility] joint    Imaging / Electrodiagnostic Tests:     Finger XR: AP, Lateral, Oblique views     Clinical Indication:  1. Thumb pain, right    2. 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 There is hypoplasia of the ulnar condyle of the proximal phalanx head    Impression: thumb IP degenerative changes as described above     Clarissa Urena MD         Assessment:   1. Thumb pain, right    2. Degenerative arthritis of interphalangeal joint of right thumb        Plan:   We discussed the diagnosis and different treatment options. We discussed observation, therapy, antiinflammatory medications and other pertinent treatment modalities.     After discussing in detail the patient elects to proceed with surgical treatment, with right thumb interphalangeal joint arthrodesis. Because of her history of epidermolysis bullosa, and the soft tissue quality a the pad of the thumb, she is at increased risk for wound healing issues and infection. Patient understands risks and benefits of right thumb interphalangeal joint arthrodesis including but not limited to nerve injury, vessel injury, infection, failure to achieve desired results and possible need for additional surgery. Patient understands and wishes to proceed with surgery. On Exam:   The patient is alert and oriented  Cardiovascular: regular rate and rhythm  Respiratory: Non labored breathing  . Patient voiced accordance and understanding of the information provided and the formulated plan. All questions were answered to the patient's satisfaction during the encounter.     4 This is a chronic illness with exacerbation, progression, or side effect of treatment  Treatment at this time: Elective major surgery with procedural risk factors    Johana Lutz MD  Orthopaedic Surgery  05/31/22  12:23 PM

## 2022-05-31 NOTE — H&P (VIEW-ONLY)
Orthopaedic Hand Clinic Note    Name: Marilee Moran  YOB: 2000  Gender: female  MRN: [id number]      CC: Patient referred for evaluation of upper extremity pain    HPI: Marilee Moran is a 25 y.o. female with a chief complaint of right thumb pain and deformity which has been present since soon after birth. The patient has a history of epidermolysis bullosa, and as a result she says her right hand had to be bandaged when she was a baby, and her thumb has grown incorrectly since then. She has thumb pain and difficulty using it. .      ROS/Meds/PSH/PMH/FH/SH: I personally reviewed the patients standard intake form. Pertinents are discussed in the HPI    Physical Examination:    Musculoskeletal Exam:  Examination on the right upper extremity demonstrates cap refill < 5 seconds in all fingers, skin is intact. There is callusing and a atrophy of the thumb volar pad. There is a 90 degree radial deviation deformity at the thumb IP joint, which is able to be passively corrected. She has no pain at the  or [facility] joint    Imaging / Electrodiagnostic Tests:     Finger XR: AP, Lateral, Oblique views     Clinical Indication:  1. Thumb pain, right    2. 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 There is hypoplasia of the ulnar condyle of the proximal phalanx head    Impression: thumb IP degenerative changes as described above     Gabby Pang MD         Assessment:   1. Thumb pain, right    2. Degenerative arthritis of interphalangeal joint of right thumb        Plan:   We discussed the diagnosis and different treatment options. We discussed observation, therapy, antiinflammatory medications and other pertinent treatment modalities.     After discussing in detail the patient elects to proceed with surgical treatment, with right thumb interphalangeal joint arthrodesis. Because of her history of epidermolysis bullosa, and the soft tissue quality a the pad of the thumb, she is at increased risk for wound healing issues and infection. Patient understands risks and benefits of right thumb interphalangeal joint arthrodesis including but not limited to nerve injury, vessel injury, infection, failure to achieve desired results and possible need for additional surgery. Patient understands and wishes to proceed with surgery. On Exam:   The patient is alert and oriented  Cardiovascular: regular rate and rhythm  Respiratory: Non labored breathing  . Patient voiced accordance and understanding of the information provided and the formulated plan. All questions were answered to the patient's satisfaction during the encounter.     4 This is a chronic illness with exacerbation, progression, or side effect of treatment  Treatment at this time: Elective major surgery with procedural risk factors    Collette Mow, MD  Orthopaedic Surgery  05/31/22  12:23 PM

## 2022-06-03 ENCOUNTER — HOSPITAL ENCOUNTER (EMERGENCY)
Age: 22
Discharge: HOME | End: 2022-06-03
Attending: EMERGENCY MEDICINE
Payer: MEDICAID

## 2022-06-03 VITALS
HEIGHT: 60 IN | HEART RATE: 69 BPM | DIASTOLIC BLOOD PRESSURE: 61 MMHG | RESPIRATION RATE: 16 BRPM | BODY MASS INDEX: 43.19 KG/M2 | WEIGHT: 220 LBS | TEMPERATURE: 98.7 F | SYSTOLIC BLOOD PRESSURE: 104 MMHG | OXYGEN SATURATION: 99 %

## 2022-06-03 DIAGNOSIS — L02.91 ABSCESS: Primary | ICD-10-CM

## 2022-06-03 PROCEDURE — 99283 EMERGENCY DEPT VISIT LOW MDM: CPT

## 2022-06-03 PROCEDURE — 10060 I&D ABSCESS SIMPLE/SINGLE: CPT

## 2022-06-03 RX ORDER — SULFAMETHOXAZOLE AND TRIMETHOPRIM 800; 160 MG/1; MG/1
1 TABLET ORAL 2 TIMES DAILY
Qty: 14 TABLET | Refills: 0 | Status: SHIPPED | OUTPATIENT
Start: 2022-06-03 | End: 2022-06-10

## 2022-06-03 ASSESSMENT — PAIN SCALES - GENERAL: PAINLEVEL_OUTOF10: 7

## 2022-06-03 ASSESSMENT — ENCOUNTER SYMPTOMS
SHORTNESS OF BREATH: 0
ABDOMINAL PAIN: 0

## 2022-06-03 ASSESSMENT — PAIN - FUNCTIONAL ASSESSMENT: PAIN_FUNCTIONAL_ASSESSMENT: 0-10

## 2022-06-03 NOTE — ED NOTES
I have reviewed discharge instructions with the patient. The patient verbalized understanding. Patient left ED via Discharge Method: ambulatory to Home with self. Opportunity for questions and clarification provided. Patient given 1 scripts. To continue your aftercare when you leave the hospital, you may receive an automated call from our care team to check in on how you are doing. This is a free service and part of our promise to provide the best care and service to meet your aftercare needs.  If you have questions, or wish to unsubscribe from this service please call [phone]. Thank you for Choosing our [facility] Emergency Department.           Sultana Sargent RN  06/03/22 3689

## 2022-06-03 NOTE — ED PROVIDER NOTES
[employer] Emergency Department Provider Note                   PCP:                None Provider               Age: 25 y.o. Sex: female     No diagnosis found. DISPOSITION         New Prescriptions    No medications on file       No orders of the defined types were placed in this encounter. MDM  Number of Diagnoses or Management Options  Abscess: new, no workup  Diagnosis management comments: 79-year-old female who presents emergency department today with complaint of an abscess under her left breast.  She states it started a few days ago and has progressively worsened. On exam, patient does have approximately 1 cm abscess at 5:00 on left breast.  Verbal consent obtained and incision and drainage performed as documented in procedure note. Patient tolerated well. Educated on wound care. She is afebrile. She is not toxic or acutely ill appearing today. I feel patient is appropriate for outpatient treatment with oral antibiotics. Encouraged close follow-up with her primary care provider in 2 to 3 days for recheck. I have discussed the results of all labs, procedures, radiographs, and/or treatments with the patient and available family members. Treatment plan is agreed upon by the patient and the patient is ready for discharge. Questions about treatment in the ED and differential diagnosis of presenting condition were answered. Patient was given verbal discharge instructions including, but not limited to, importance of returning to the emergency department for any concern of worsening or continued symptoms. Instructions were given to follow-up with a primary care provider or specialist within 1 to 2 days. Adverse effects of medications, if prescribed, were discussed and the patient was advised to refrain from significant physical activity until followed up by a primary care physician and to not drive or operate heavy machinery after taking any sedating substances.       Risk of Complications, Morbidity, and/or Mortality  Presenting problems: low  Diagnostic procedures: low  Management options: low    Patient Progress  Patient progress: improved       Taz Burnett is a 25 y.o. female who presents to the Emergency Department with chief complaint of    Chief Complaint   Patient presents with    Skin Problem      41-year-old female with a history of hypertension, obesity, GERD, and epidermolysis bullosa who presents emergency department today with complaint of an abscess under her left breast.  Patient reports a history of skin abscesses. She states that she first noticed the area about 3 days ago and has progressively worsened. She denies any drainage from the area. She denies fever, chills, nausea, vomiting, diarrhea, chest pain, abdominal pain, or shortness of breath. She denies any treatment prior to arrival.    The history is provided by the patient. All other systems reviewed and are negative. Review of Systems   Constitutional: Negative for chills and fever. Respiratory: Negative for shortness of breath. Cardiovascular: Negative for chest pain. Gastrointestinal: Negative for abdominal pain. Skin: Positive for wound. All other systems reviewed and are negative. Past Medical History:   Diagnosis Date    Asthma     uses inhalers     Chronic pain     Contracture of left Achilles tendon     Depression     Epidermolysis bullosa     GERD (gastroesophageal reflux disease)     managed with prilosec     Hypertension     managed with medication     Ill-defined condition     skin condtion    Morbid obesity ([address]     Seasonal allergic rhinitis     Vitamin D deficiency         Past Surgical History:   Procedure Laterality Date    GI      nissen with 2 G-tubes-removed        No family history on file.         Social Connections:     Frequency of Communication with Friends and Family: Not on file    Frequency of Social Gatherings with Friends and Family: Not on file    Attends Bahai Services: Not on file    Active Member of Clubs or Organizations: Not on file    Attends Club or Organization Meetings: Not on file    Marital Status: Not on file        Allergies   Allergen Reactions    Latex Other (See Comments)    White Petrolatum Other (See Comments)    Amoxicillin Nausea And Vomiting        Vitals signs and nursing note reviewed. Patient Vitals for the past 4 hrs:   Temp Pulse Resp BP SpO2   06/03/22 0748 98.7 °F (37.1 °C) 69 16 129/76 95 %          Physical Exam  Vitals and nursing note reviewed. Constitutional:       General: She is not in acute distress. Appearance: Normal appearance. She is well-developed. She is obese. She is not ill-appearing, toxic-appearing or diaphoretic. HENT:      Head: Normocephalic and atraumatic. Mouth/Throat:      Mouth: Mucous membranes are moist.   Eyes:      General: No scleral icterus. Extraocular Movements: Extraocular movements intact. Cardiovascular:      Rate and Rhythm: Normal rate. Pulmonary:      Effort: Pulmonary effort is normal. No respiratory distress. Chest:       Abdominal:      General: Abdomen is flat. There is no distension. Skin:     General: Skin is warm and dry. Capillary Refill: Capillary refill takes less than 2 seconds. Findings: Abscess present. Comments: Approximately 1 cm abscess at 5 o'clock on left breast with surrounding erythema. Neurological:      General: No focal deficit present. Mental Status: She is alert and oriented to person, place, and time. Psychiatric:         Mood and Affect: Mood normal.         Behavior: Behavior normal.          Incision/Drainage    Date/Time: 6/3/2022 10:17 AM  Performed by: SIDNEY Cueva - CNP  Authorized by:  Awais Lino MD     Consent:     Consent obtained:  Verbal    Consent given by:  Patient    Risks discussed:  Bleeding, incomplete drainage, pain, infection and damage to other organs Alternatives discussed:  Alternative treatment  Location:     Type:  Abscess    Location:  Trunk    Trunk location:  L breast  Pre-procedure details:     Skin preparation:  Chloraprep  Anesthesia (see MAR for exact dosages): Anesthesia method:  Local infiltration    Local anesthetic:  Lidocaine 1% WITH epi  Procedure type:     Complexity:  Simple  Procedure details:     Incision types:  Stab incision    Incision depth:  Dermal    Scalpel blade:  11    Wound management:  Probed and deloculated    Drainage:  Bloody and purulent    Drainage amount:  Copious    Wound treatment:  Wound left open    Packing materials:  None  Post-procedure details:     Patient tolerance of procedure: Tolerated well, no immediate complications        Labs Reviewed - No data to display     No orders to display            Bryant Coma Scale  Eye Opening: Spontaneous  Best Verbal Response: Oriented  Best Motor Response: Obeys commands  Victor Hugo Coma Scale Score: 15                     Voice dictation software was used during the making of this note. This software is not perfect and grammatical and other typographical errors may be present. This note has not been completely proofread for errors.        SIDNEY Arellano - CNP  06/03/22 1019

## 2022-06-03 NOTE — ED TRIAGE NOTES
Patient ambulatory to triage with mask in place. Patient reports she thinks she has an abscess under her left breast x a few days. Denies draining at this time.

## 2022-06-07 ENCOUNTER — ANESTHESIA EVENT (OUTPATIENT)
Dept: SURGERY | Age: 22
End: 2022-06-07
Payer: MEDICAID

## 2022-06-07 RX ORDER — GLUCAGON 1 MG/ML
1 KIT INJECTION PRN
Status: CANCELLED | OUTPATIENT
Start: 2022-06-07

## 2022-06-07 RX ORDER — HYDROMORPHONE HYDROCHLORIDE 2 MG/ML
0.5 INJECTION, SOLUTION INTRAMUSCULAR; INTRAVENOUS; SUBCUTANEOUS EVERY 5 MIN PRN
Status: CANCELLED | OUTPATIENT
Start: 2022-06-07

## 2022-06-07 RX ORDER — DEXTROSE MONOHYDRATE 50 MG/ML
100 INJECTION, SOLUTION INTRAVENOUS PRN
Status: CANCELLED | OUTPATIENT
Start: 2022-06-07

## 2022-06-07 RX ORDER — OXYCODONE HYDROCHLORIDE 5 MG/1
5 TABLET ORAL PRN
Status: CANCELLED | OUTPATIENT
Start: 2022-06-07 | End: 2022-06-07

## 2022-06-07 RX ORDER — OXYCODONE HYDROCHLORIDE 5 MG/1
10 TABLET ORAL PRN
Status: CANCELLED | OUTPATIENT
Start: 2022-06-07 | End: 2022-06-07

## 2022-06-07 RX ORDER — ONDANSETRON 2 MG/ML
4 INJECTION INTRAMUSCULAR; INTRAVENOUS
Status: CANCELLED | OUTPATIENT
Start: 2022-06-07 | End: 2022-06-07

## 2022-06-07 RX ORDER — LABETALOL HYDROCHLORIDE 5 MG/ML
10 INJECTION, SOLUTION INTRAVENOUS
Status: CANCELLED | OUTPATIENT
Start: 2022-06-07

## 2022-06-07 RX ORDER — HYDROMORPHONE HYDROCHLORIDE 2 MG/ML
0.25 INJECTION, SOLUTION INTRAMUSCULAR; INTRAVENOUS; SUBCUTANEOUS EVERY 5 MIN PRN
Status: CANCELLED | OUTPATIENT
Start: 2022-06-07

## 2022-06-07 NOTE — PROGRESS NOTES
Patient verified name and     Order for consent WAS found in EHR and matches case posting; patient verified. Type 1B surgery, PHONE assessment complete. Labs per surgeon: NONE  Labs per anesthesia protocol: HCG DOS  EKG: [facility] approved surgical skin cleanser and instructions given per hospital policy. Patient provided with and instructed on educational handouts including Guide to Surgery, Pain Management, Hand Hygiene, Blood Transfusion Education, and [place] [facility] Brochure. Patient answered medical/surgical history questions at their best of ability. All prior to admission medications documented in [facility]. Original medication prescription bottle WAS NOT visualized during patient appointment. Patient instructed to hold all vitamins 7 days prior to surgery and NSAIDS 5 days prior to surgery, patient verbalized understanding. Patient teach back successful and patient demonstrates knowledge of instructions.

## 2022-06-07 NOTE — ANESTHESIA PRE PROCEDURE
Department of Anesthesiology  Preprocedure Note       Name:  Sunni Moeller   Age:  25 y.o.  :  2000                                          MRN:  [id number]         Date:  2022      Surgeon: Tarah Farooq):  Merlin Helena, MD    Procedure: Procedure(s):  Right thumb interphalangeal joint arthrodesis    Medications prior to admission:   Prior to Admission medications    Medication Sig Start Date End Date Taking? Authorizing Provider   albuterol sulfate (PROAIR RESPICLICK) 410 (90 Base) MCG/ACT aerosol powder inhalation Inhale 2 puffs into the lungs every 4 hours as needed for Wheezing or Shortness of Breath   Yes Historical Provider, MD   sulfamethoxazole-trimethoprim (BACTRIM DS) 800-160 MG per tablet Take 1 tablet by mouth 2 times daily for 7 days  Patient taking differently: Take 1 tablet by mouth 2 times daily To finish 6/8/22 6/3/22 6/10/22  SIDNEY Parsons - CNP   fluticasone (FLONASE) 50 MCG/ACT nasal spray 2 sprays 2 times daily as needed 21   Ar Automatic Reconciliation   fluticasone-umeclidin-vilant (TRELEGY ELLIPTA) 100-62.5-25 MCG/INH AEPB 1 puff daily 21   Ar Automatic Reconciliation   gabapentin (NEURONTIN) 300 MG capsule 3 times daily. 21   Ar Automatic Reconciliation   hydrOXYzine (ATARAX) 25 MG tablet 25 mg 3 times daily 12/10/21   Ar Automatic Reconciliation   Melatonin 5 MG CAPS Take 1 tablet by mouth nightly as needed     Ar Automatic Reconciliation   metoprolol tartrate (LOPRESSOR) 25 MG tablet 25 mg 2 times daily 21   Ar Automatic Reconciliation   omeprazole (PRILOSEC) 40 MG delayed release capsule Take 40 mg by mouth daily    Ar Automatic Reconciliation   ondansetron (ZOFRAN) 4 MG tablet Take 4 mg by mouth every 8 hours as needed 3/24/22   Ar Automatic Reconciliation       Current medications:    No current facility-administered medications for this encounter.      Current Outpatient Medications   Medication Sig Dispense Refill    albuterol sulfate (PROAIR RESPICLICK) 359 (90 Base) MCG/ACT aerosol powder inhalation Inhale 2 puffs into the lungs every 4 hours as needed for Wheezing or Shortness of Breath      sulfamethoxazole-trimethoprim (BACTRIM DS) 800-160 MG per tablet Take 1 tablet by mouth 2 times daily for 7 days (Patient taking differently: Take 1 tablet by mouth 2 times daily To finish 6/8/22) 14 tablet 0    fluticasone (FLONASE) 50 MCG/ACT nasal spray 2 sprays 2 times daily as needed      fluticasone-umeclidin-vilant (TRELEGY ELLIPTA) 100-62.5-25 MCG/INH AEPB 1 puff daily      gabapentin (NEURONTIN) 300 MG capsule 3 times daily.  hydrOXYzine (ATARAX) 25 MG tablet 25 mg 3 times daily      Melatonin 5 MG CAPS Take 1 tablet by mouth nightly as needed       metoprolol tartrate (LOPRESSOR) 25 MG tablet 25 mg 2 times daily      omeprazole (PRILOSEC) 40 MG delayed release capsule Take 40 mg by mouth daily      ondansetron (ZOFRAN) 4 MG tablet Take 4 mg by mouth every 8 hours as needed         Allergies:     Allergies   Allergen Reactions    Latex Other (See Comments)     Per pt-- unsure of rx-- states as child    White Petrolatum Itching    Amoxicillin Nausea And Vomiting       Problem List:    Patient Active Problem List   Diagnosis Code    Vitamin D deficiency E55.9    Other visual disturbances H53.8    Meridional amblyopia, 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    Epidermolysis bullosa Q81.9    Allergic rhinitis J30.9    Chronic pain G89.29    Myopia of both eyes with regular astigmatism H52.13, H52.223    Degenerative arthritis of interphalangeal joint of right thumb M15.8       Past Medical History:        Diagnosis Date    Anxiety     Asthma     daily inhalers    Boil, breast     under left breast--- tx- per pt last antibiotic 6/8/22-- states area  completely healed    Chronic pain     feet and hands    Depression     Epidermolysis bullosa     GERD (gastroesophageal reflux disease)     controlled with med    Hypertension     managed with medication     Morbid obesity (HCC)     BMI = 42    Pain of right thumb     Seasonal allergic rhinitis     Vitamin D deficiency        Past Surgical History:        Procedure Laterality Date    GI  age 3    nissen with 2 G-tubes- last tube removed at age 11   [facility] ORTHOPEDIC SURGERY Left 03/2022    FOOT SURG       Social History:    Social History     Tobacco Use    Smoking status: Never Smoker    Smokeless tobacco: Never Used   Substance Use Topics    Alcohol use: Never                                Counseling given: Not Answered      Vital Signs (Current):   Vitals:    06/07/22 1507   Weight: 220 lb (99.8 kg)   Height: 5' (1.524 m)                                              BP Readings from Last 3 Encounters:   06/03/22 104/61       NPO Status:                                                                                 BMI:   Wt Readings from Last 3 Encounters:   06/03/22 220 lb (99.8 kg)   05/31/22 220 lb (99.8 kg)   03/24/22 230 lb (104.3 kg)     Body mass index is 42.97 kg/m². CBC: No results found for: WBC, RBC, HGB, HCT, MCV, RDW, PLT    CMP: No results found for: NA, K, CL, CO2, BUN, CREATININE, GFRAA, AGRATIO, LABGLOM, GLUCOSE, GLU, PROT, CALCIUM, BILITOT, ALKPHOS, AST, ALT    POC Tests: No results for input(s): POCGLU, POCNA, POCK, POCCL, POCBUN, POCHEMO, POCHCT in the last 72 hours.     Coags: No results found for: PROTIME, INR, APTT    HCG (If Applicable): No results found for: PREGTESTUR, PREGSERUM, HCG, HCGQUANT     ABGs: No results found for: PHART, PO2ART, XEY5XJD, NLB9CWZ, BEART, I9WWJSNC     Type & Screen (If Applicable):  No results found for: LABABO, LABRH    Drug/Infectious Status (If Applicable):  No results found for: HIV, HEPCAB    COVID-19 Screening (If Applicable): No results found for: COVID19        Anesthesia Evaluation  Patient summary reviewed and Nursing notes reviewed no history of anesthetic complications:   Airway: Mallampati: I  TM distance: >3 FB   Neck ROM: full  Mouth opening: > = 3 FB   Dental:    (+) poor dentition      Pulmonary: breath sounds clear to auscultation  (+) sleep apnea (undiagnosed):  asthma: exercise-induced asthma,                            Cardiovascular:  Exercise tolerance: good (>4 METS),   (+) hypertension: mild,         Rhythm: regular  Rate: normal                    Neuro/Psych:   (+) depression/anxiety             GI/Hepatic/Renal:   (+) GERD: well controlled, morbid obesity          Endo/Other:                     Abdominal:   (+) obese,           Vascular: Other Findings:           Anesthesia Plan      TIVA and [place] block     ASA 3     (Per listing in snap board - aiyana block)  Induction: intravenous. MIPS: Postoperative opioids intended and Prophylactic antiemetics administered. Anesthetic plan and risks discussed with patient and mother. Plan discussed with CRNA.     Attending anesthesiologist reviewed and agrees with Sahara Mccallum MD   6/7/2022

## 2022-06-07 NOTE — PROGRESS NOTES
PLEASE CONTINUE TAKING ALL PRESCRIPTION MEDICATIONS UP TO THE DAY OF SURGERY UNLESS OTHERWISE DIRECTED BELOW. DISCONTINUE all vitamins and supplements 7 days prior to surgery. DISCONTINUE Non-Steriodal Anti-Inflammatory (NSAIDS) such as Advil and Aleve 5 days prior to surgery. Home Medications to take  the day of surgery    gabapentin, atarax, lopressor, omeprazole, bactrim, trelgy and bring albuterol inhaler dos           Home Medications   to Hold   none        Comments       On the day before surgery please take Acetaminophen 1000mg in the morning and then again before bed. You may substitute for Tylenol 650 mg. Please do not bring home medications with you on the day of surgery unless otherwise directed by your nurse. If you are instructed to bring home medications, please give them to your nurse as they will be administered by the nursing staff. If you have any questions, please call [facility] Sarah Srivastava [phone] or [address] [place] [address] [phone]. A copy of this note was provided to the patient for reference.

## 2022-06-08 ENCOUNTER — APPOINTMENT (OUTPATIENT)
Dept: GENERAL RADIOLOGY | Age: 22
End: 2022-06-08
Attending: ORTHOPAEDIC SURGERY
Payer: MEDICAID

## 2022-06-08 ENCOUNTER — HOSPITAL ENCOUNTER (OUTPATIENT)
Age: 22
Setting detail: OUTPATIENT SURGERY
Discharge: HOME | End: 2022-06-08
Attending: ORTHOPAEDIC SURGERY | Admitting: ORTHOPAEDIC SURGERY
Payer: MEDICAID

## 2022-06-08 ENCOUNTER — ANESTHESIA (OUTPATIENT)
Dept: SURGERY | Age: 22
End: 2022-06-08
Payer: MEDICAID

## 2022-06-08 VITALS
RESPIRATION RATE: 16 BRPM | WEIGHT: 220 LBS | HEIGHT: 60 IN | OXYGEN SATURATION: 98 % | DIASTOLIC BLOOD PRESSURE: 79 MMHG | TEMPERATURE: 97.3 F | HEART RATE: 68 BPM | BODY MASS INDEX: 43.19 KG/M2 | SYSTOLIC BLOOD PRESSURE: 172 MMHG

## 2022-06-08 DIAGNOSIS — M15.8 DEGENERATIVE ARTHRITIS OF INTERPHALANGEAL JOINT OF RIGHT THUMB: ICD-10-CM

## 2022-06-08 PROBLEM — E55.9 VITAMIN D DEFICIENCY: Status: ACTIVE | Noted: 2018-10-12

## 2022-06-08 LAB — HCG SERPL QL: NEGATIVE

## 2022-06-08 PROCEDURE — 7100000001 HC PACU RECOVERY - ADDTL 15 MIN: Performed by: ORTHOPAEDIC SURGERY

## 2022-06-08 PROCEDURE — 2720000010 HC SURG SUPPLY STERILE: Performed by: ORTHOPAEDIC SURGERY

## 2022-06-08 PROCEDURE — 2709999900 HC NON-CHARGEABLE SUPPLY: Performed by: ORTHOPAEDIC SURGERY

## 2022-06-08 PROCEDURE — 2500000003 HC RX 250 WO HCPCS: Performed by: NURSE ANESTHETIST, CERTIFIED REGISTERED

## 2022-06-08 PROCEDURE — 26860 FUSION OF FINGER JOINT: CPT | Performed by: ORTHOPAEDIC SURGERY

## 2022-06-08 PROCEDURE — 3700000001 HC ADD 15 MINUTES (ANESTHESIA): Performed by: ORTHOPAEDIC SURGERY

## 2022-06-08 PROCEDURE — 84703 CHORIONIC GONADOTROPIN ASSAY: CPT

## 2022-06-08 PROCEDURE — 6360000002 HC RX W HCPCS: Performed by: ORTHOPAEDIC SURGERY

## 2022-06-08 PROCEDURE — 3700000000 HC ANESTHESIA ATTENDED CARE: Performed by: ORTHOPAEDIC SURGERY

## 2022-06-08 PROCEDURE — 2500000003 HC RX 250 WO HCPCS: Performed by: ORTHOPAEDIC SURGERY

## 2022-06-08 PROCEDURE — 6360000002 HC RX W HCPCS: Performed by: NURSE ANESTHETIST, CERTIFIED REGISTERED

## 2022-06-08 PROCEDURE — 3600000014 HC SURGERY LEVEL 4 ADDTL 15MIN: Performed by: ORTHOPAEDIC SURGERY

## 2022-06-08 PROCEDURE — 7100000010 HC PHASE II RECOVERY - FIRST 15 MIN: Performed by: ORTHOPAEDIC SURGERY

## 2022-06-08 PROCEDURE — C1713 ANCHOR/SCREW BN/BN,TIS/BN: HCPCS | Performed by: ORTHOPAEDIC SURGERY

## 2022-06-08 PROCEDURE — 6370000000 HC RX 637 (ALT 250 FOR IP): Performed by: ANESTHESIOLOGY

## 2022-06-08 PROCEDURE — 3600000004 HC SURGERY LEVEL 4 BASE: Performed by: ORTHOPAEDIC SURGERY

## 2022-06-08 PROCEDURE — 7100000000 HC PACU RECOVERY - FIRST 15 MIN: Performed by: ORTHOPAEDIC SURGERY

## 2022-06-08 PROCEDURE — 2580000003 HC RX 258: Performed by: ANESTHESIOLOGY

## 2022-06-08 RX ORDER — BUPIVACAINE HYDROCHLORIDE 2.5 MG/ML
INJECTION, SOLUTION EPIDURAL; INFILTRATION; INTRACAUDAL PRN
Status: DISCONTINUED | OUTPATIENT
Start: 2022-06-08 | End: 2022-06-08 | Stop reason: ALTCHOICE

## 2022-06-08 RX ORDER — SODIUM CHLORIDE 9 MG/ML
INJECTION, SOLUTION INTRAVENOUS PRN
Status: DISCONTINUED | OUTPATIENT
Start: 2022-06-08 | End: 2022-06-08 | Stop reason: HOSPADM

## 2022-06-08 RX ORDER — FENTANYL CITRATE 50 UG/ML
100 INJECTION, SOLUTION INTRAMUSCULAR; INTRAVENOUS
Status: DISCONTINUED | OUTPATIENT
Start: 2022-06-08 | End: 2022-06-08 | Stop reason: HOSPADM

## 2022-06-08 RX ORDER — SODIUM CHLORIDE 0.9 % (FLUSH) 0.9 %
5-40 SYRINGE (ML) INJECTION PRN
Status: DISCONTINUED | OUTPATIENT
Start: 2022-06-08 | End: 2022-06-08 | Stop reason: HOSPADM

## 2022-06-08 RX ORDER — LIDOCAINE HYDROCHLORIDE 10 MG/ML
1 INJECTION, SOLUTION EPIDURAL; INFILTRATION; INTRACAUDAL; PERINEURAL
Status: DISCONTINUED | OUTPATIENT
Start: 2022-06-08 | End: 2022-06-08 | Stop reason: HOSPADM

## 2022-06-08 RX ORDER — MIDAZOLAM HYDROCHLORIDE 2 MG/2ML
2 INJECTION, SOLUTION INTRAMUSCULAR; INTRAVENOUS
Status: DISCONTINUED | OUTPATIENT
Start: 2022-06-08 | End: 2022-06-08 | Stop reason: HOSPADM

## 2022-06-08 RX ORDER — SODIUM CHLORIDE, SODIUM LACTATE, POTASSIUM CHLORIDE, CALCIUM CHLORIDE 600; 310; 30; 20 MG/100ML; MG/100ML; MG/100ML; MG/100ML
INJECTION, SOLUTION INTRAVENOUS CONTINUOUS
Status: DISCONTINUED | OUTPATIENT
Start: 2022-06-08 | End: 2022-06-08 | Stop reason: HOSPADM

## 2022-06-08 RX ORDER — MIDAZOLAM HYDROCHLORIDE 1 MG/ML
INJECTION INTRAMUSCULAR; INTRAVENOUS PRN
Status: DISCONTINUED | OUTPATIENT
Start: 2022-06-08 | End: 2022-06-08 | Stop reason: SDUPTHER

## 2022-06-08 RX ORDER — SODIUM CHLORIDE 0.9 % (FLUSH) 0.9 %
5-40 SYRINGE (ML) INJECTION EVERY 12 HOURS SCHEDULED
Status: DISCONTINUED | OUTPATIENT
Start: 2022-06-08 | End: 2022-06-08 | Stop reason: HOSPADM

## 2022-06-08 RX ORDER — PROPOFOL 10 MG/ML
INJECTION, EMULSION INTRAVENOUS PRN
Status: DISCONTINUED | OUTPATIENT
Start: 2022-06-08 | End: 2022-06-08 | Stop reason: SDUPTHER

## 2022-06-08 RX ORDER — CELECOXIB 200 MG/1
200 CAPSULE ORAL ONCE
Status: DISCONTINUED | OUTPATIENT
Start: 2022-06-08 | End: 2022-06-08 | Stop reason: HOSPADM

## 2022-06-08 RX ORDER — ACETAMINOPHEN 500 MG
1000 TABLET ORAL ONCE
Status: COMPLETED | OUTPATIENT
Start: 2022-06-08 | End: 2022-06-08

## 2022-06-08 RX ORDER — FENTANYL CITRATE 50 UG/ML
25 INJECTION, SOLUTION INTRAMUSCULAR; INTRAVENOUS
Status: DISCONTINUED | OUTPATIENT
Start: 2022-06-08 | End: 2022-06-08 | Stop reason: HOSPADM

## 2022-06-08 RX ORDER — LIDOCAINE HYDROCHLORIDE 5 MG/ML
INJECTION, SOLUTION INFILTRATION; INTRAVENOUS PRN
Status: DISCONTINUED | OUTPATIENT
Start: 2022-06-08 | End: 2022-06-08 | Stop reason: SDUPTHER

## 2022-06-08 RX ORDER — HYDROCODONE BITARTRATE AND ACETAMINOPHEN 5; 325 MG/1; MG/1
1 TABLET ORAL EVERY 6 HOURS PRN
Qty: 15 TABLET | Refills: 0 | Status: SHIPPED | OUTPATIENT
Start: 2022-06-08 | End: 2022-06-11

## 2022-06-08 RX ADMIN — Medication 2000 MG: at 08:59

## 2022-06-08 RX ADMIN — PROPOFOL 30 MG: 10 INJECTION, EMULSION INTRAVENOUS at 09:19

## 2022-06-08 RX ADMIN — PROPOFOL 50 MG: 10 INJECTION, EMULSION INTRAVENOUS at 09:00

## 2022-06-08 RX ADMIN — PROPOFOL 30 MG: 10 INJECTION, EMULSION INTRAVENOUS at 09:03

## 2022-06-08 RX ADMIN — MIDAZOLAM 2 MG: 1 INJECTION INTRAMUSCULAR; INTRAVENOUS at 08:55

## 2022-06-08 RX ADMIN — PROPOFOL 30 MG: 10 INJECTION, EMULSION INTRAVENOUS at 09:37

## 2022-06-08 RX ADMIN — SODIUM CHLORIDE, POTASSIUM CHLORIDE, SODIUM LACTATE AND CALCIUM CHLORIDE: 600; 310; 30; 20 INJECTION, SOLUTION INTRAVENOUS at 07:40

## 2022-06-08 RX ADMIN — PROPOFOL 30 MG: 10 INJECTION, EMULSION INTRAVENOUS at 09:29

## 2022-06-08 RX ADMIN — ACETAMINOPHEN 1000 MG: 500 TABLET ORAL at 07:41

## 2022-06-08 RX ADMIN — PROPOFOL 30 MG: 10 INJECTION, EMULSION INTRAVENOUS at 09:12

## 2022-06-08 RX ADMIN — LIDOCAINE HYDROCHLORIDE 20 ML: 5 INJECTION, SOLUTION INFILTRATION at 08:57

## 2022-06-08 ASSESSMENT — PAIN - FUNCTIONAL ASSESSMENT: PAIN_FUNCTIONAL_ASSESSMENT: 0-10

## 2022-06-08 NOTE — INTERVAL H&P NOTE
Update History & Physical    The patient's History and Physical of was reviewed with the patient and I examined the patient. There was no change. The surgical site was confirmed by the patient and me. Plan: The risks, benefits, expected outcome, and alternative to the recommended procedure have been discussed with the patient. Patient understands and wants to proceed with the procedure.

## 2022-06-08 NOTE — ANESTHESIA PROCEDURE NOTES
Peripheral Block    Patient location during procedure: OR  Start time: 6/8/2022 8:56 AM  Staffing  Performed: resident/CRNA   Resident/CRNA: SIDNEY Ricardo CRNA  Preanesthetic Checklist  Completed: patient identified, IV checked, site marked, risks and benefits discussed, surgical/procedural consents, equipment checked, pre-op evaluation, timeout performed, anesthesia consent given, oxygen available, monitors applied/VS acknowledged, fire risk safety assessment completed and verbalized and blood product R/B/A discussed and consented  Peripheral Block  Patient position: supine  Patient monitoring: cardiac monitor, continuous pulse ox, continuous capnometry, frequent blood pressure checks, IV access, oxygen and responsive to questions  Block type: Darby block  Laterality: right  Injection technique: single-shot  Guidance: other  Local infiltration: lidocaine  Infiltration strength: 0.5 %  Dose: 20 mL  Local infiltration: lidocaine  Assessment  Slow fractionated injection: no  Hemodynamics: stableno permanent images obtainedOutcomes: uncomplicated and patient tolerated procedure well  Additional Notes  Darby block per [employer] CRNA no negative reactions    Reason for block: primary anesthetic and at surgeon's request

## 2022-06-08 NOTE — ANESTHESIA POSTPROCEDURE EVALUATION
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 Nima Phase I: Nima Score: 8    Nima Phase II:      Last vitals: Reviewed and per EMR flowsheets.        Anesthesia Post Evaluation    Patient location during evaluation: PACU  Patient participation: complete - patient participated  Level of consciousness: awake and alert  Pain score: 0  Airway patency: patent  Nausea & Vomiting: no nausea and no vomiting  Complications: no  Cardiovascular status: hemodynamically stable  Respiratory status: acceptable, nonlabored ventilation and spontaneous ventilation  Hydration status: euvolemic  Comments: /61   Pulse 60   Temp 97.3 °F (36.3 °C) (Temporal)   Resp 14   Ht 5' (1.524 m)   Wt 220 lb (99.8 kg)   LMP 05/05/2022   SpO2 100%   Breastfeeding No   BMI 42.97 kg/m²     Multimodal analgesia pain management approach

## 2022-06-08 NOTE — OP NOTE
ORTHOPAEDIC SURGICAL NOTE        Katarzyna Aguirre female 25 y.o.  [id number]   [unfilled]     PRE-OP DIAGNOSIS: Degenerative arthritis of interphalangeal joint of right thumb [M15.8]   POST-OP DIAGNOSIS: same  LATERALITY: Right     Procedure(s):  Right thumb interphalangeal joint arthrodesis    SURGEON:   Alan William MD     IMPLANTS:   Implant Name Type Inv. Item Serial No.  Lot No. LRB No. Used Action   2.0 X 26 [employer]KE[employer] SCREW     [id number] Right 1 Implanted          ANESTHESIA: Gervais Block     STAFF:    Circulator: Beryl Rutledge RN  Scrub Person First: Tia Paiz     ESTIMATED BLOOD LOSS: Minimal       TOTAL IV FLUIDS : See anesthesia note    COMPLICATIONS: None     DRAINS:       TOURNIQUET TIME:   Total Tourniquet Time Documented:  Arm  (Right) - 37 minutes  Total: Arm  (Right) - 37 minutes       INDICATION FOR PROCEDURE:     Katarzyna Aguirre has a long history of pain and deformity of the right thumb. Radiographs demonstrated severe degenerative changes at the thumb IP joint. Surgical and non-surgical treatment options were discussed with the patient and their family, as well as the risk and benefits of each option. After thorough discussion, the patient decided to proceed with surgical management. Specific to this treatment plan, we discussed in detail surgical risks including scar, pain, bleeding, infection, anesthetic risks, neurovascular injury, failure to achieve desired results, hardware problems, need for further surgery,  weakness, stiffness, risk of death and potential risk of other unforseen complication. The patient consented to the procedure after discussion of the risks and benefits. DESCRIPTION OF PROCEDURE:     The patient was identified in the holding room. The right thumb was marked and confirmed as the correct operative site. They were then brought to the OR and transferred onto the OR table in the supine position.  All bony prominences were well padded. SCDs were placed on the bilateral legs throughout the case. A timeout was performed, verifying the correct patient, the correct side  and the correct procedure. Antibiotics were then administered, and were redosed during the procedure as needed at indicated intervals. A non-sterile tourniquet was placed on the arm. The Right upper extremity was pre scrubbed and then prepped and draped in routine sterile fashion. An incisional timeout was performed re-confirming the correct patient, surgical site and procedure, as well as verifying antibiotics. The right upper extremity was exsanguinated and tourniquet was inflated to 250mmHg. An H-shaped incision was made over the dorsum of the thumb IP joint, and sharp dissection was carried out through the extensor tendon and joint capsule. Collateral ligaments were released to allow exposure of the joint. Cup and cone reamers were used to prepare the articular surfaces to expose subchondral bone. The thumb IP joint was held in the appropriate alignment and a guidewire for a [employer] 2.0mm arthrodesis screw was inserted in retrograde fashion across the joint. I then drilled, measured and placed a 2.0mm arthrodesis screw. Final tightening of the screw resulted in good compression at the arthrodesis site. The tourniquet was deflated and hemostasis was ensured. The wounds were then thoroughly irrigated and closed with 4-0 Nylon     A sterile dressing was applied followed by a  alumafoam splint     The patient was awakened and taken to PACU in stable condition. All sponge and needle counts were correct at the end of the case. I was present and scrubbed for the entire procedure. DISPOSITION:    The patient will be discharged home.      Weightbearing status: NWB       CHEMICAL VTE (DVT) PROPHYLAXIS: None warranted for this case     FOLLOW-UP:  10-14 days for wound check and Xrs    Yamini Mcbride MD    06/08/22  9:52 AM

## 2022-06-21 ENCOUNTER — TELEPHONE (OUTPATIENT)
Dept: ORTHOPEDIC SURGERY | Age: 22
End: 2022-06-21

## 2022-06-21 NOTE — TELEPHONE ENCOUNTER
Pt had to cancel her post op appt today because she didn't have a ride.  Please call pt to reschedule

## 2022-06-21 NOTE — TELEPHONE ENCOUNTER
Spoke with patient, got her rescheduled for 6/27/at [facility] at 10:40 with Dr. Yash Mendoza. Patient voiced understanding.

## 2022-06-22 ENCOUNTER — TELEPHONE (OUTPATIENT)
Dept: ORTHOPEDIC SURGERY | Age: 22
End: 2022-06-22

## 2022-06-22 RX ORDER — AMITRIPTYLINE HYDROCHLORIDE 10 MG/1
1 TABLET, FILM COATED ORAL DAILY
COMMUNITY
End: 2022-10-19

## 2022-06-22 RX ORDER — ONDANSETRON 4 MG/1
TABLET, ORALLY DISINTEGRATING ORAL
COMMUNITY
Start: 2022-05-26 | End: 2022-10-19

## 2022-06-22 NOTE — TELEPHONE ENCOUNTER
Left VM for patient that Dr. Nazario Collado is in the OR today but that we would like to see her tomorrow at [facility] at 11:10 if possible. Advised her to please call me back and let me know if this works for her and what's going on.

## 2022-06-22 NOTE — TELEPHONE ENCOUNTER
Spoke with patient's grandmother- she states Francisca Oneil got the message and will see us tomorrow at [place] at 11:10.

## 2022-06-23 ENCOUNTER — OFFICE VISIT (OUTPATIENT)
Dept: ORTHOPEDIC SURGERY | Age: 22
End: 2022-06-23

## 2022-06-23 ENCOUNTER — OFFICE VISIT (OUTPATIENT)
Dept: ORTHOPEDIC SURGERY | Age: 22
End: 2022-06-23
Payer: MEDICAID

## 2022-06-23 VITALS — HEIGHT: 60 IN | WEIGHT: 220 LBS | BODY MASS INDEX: 43.19 KG/M2

## 2022-06-23 DIAGNOSIS — M79.644 CHRONIC PAIN OF RIGHT THUMB: Primary | ICD-10-CM

## 2022-06-23 DIAGNOSIS — M15.8 DEGENERATIVE ARTHRITIS OF INTERPHALANGEAL JOINT OF RIGHT THUMB: ICD-10-CM

## 2022-06-23 DIAGNOSIS — M79.644 THUMB PAIN, RIGHT: ICD-10-CM

## 2022-06-23 DIAGNOSIS — G89.29 CHRONIC PAIN OF RIGHT THUMB: Primary | ICD-10-CM

## 2022-06-23 PROCEDURE — 99024 POSTOP FOLLOW-UP VISIT: CPT | Performed by: ORTHOPAEDIC SURGERY

## 2022-06-23 PROCEDURE — L3933 FO W/O JOINTS CF: HCPCS | Performed by: OCCUPATIONAL THERAPIST

## 2022-06-23 RX ORDER — CEPHALEXIN 500 MG/1
500 CAPSULE ORAL 4 TIMES DAILY
Qty: 40 CAPSULE | Refills: 0 | Status: SHIPPED | OUTPATIENT
Start: 2022-06-23 | End: 2022-07-03

## 2022-06-23 NOTE — PROGRESS NOTES
[facility]  [address]  [address]  Dept: [phone]   Occupational Therapy Orthosis Note     Referring MD:  Tran Israel  Date: 6/23/2022  Diagnosis: 6/8/22 Right thumb interphalangeal joint arthrodesis s/p Degenerative arthritis of interphalangeal joint of right thumb     Therapy Precautions: arthrodesis    Total Timed Codes: 0 min, Total Treatment Time: 45 min     PMH:   Affected Extremity: right    Date of Injury: NA    Date of Surgery: 6/8/22    Mechanism of Injury: arthritis    Wound/Pin/Incision: dorsal thumb, covered with steri-strips    ORTHOSIS ISSUED:   : FO (Finger orthosis) C/F (custom fabricated) without joints, include soft interface, straps, includes fitting and adjustment. A protective orthosis was fabricated for the right thumb    TREATMENT PROVIDED:   Patient instructed in purpose, care, and precaution of orthosis wearing, Patient instructed in wearing schedule and Patient instructed on pin/wound care and signs of infection    WEAR SCHEDULE:   At all times    CARE OF ORTHOSIS AND PRECAUTIONS REVIEWED:   1. The orthosis can be cleaned with soap and water, rubbing alcohol, or a mild cleanser  2. Keep away from any direct source of heat, it will melt and/or lose it's shape  3. Keep away from dogs and/or pets that will chew the orthosis  4.  Should the orthosis result in increased pain, numbness/tingling, increased swelling, or overall worsening of condition, patient is to contact the office immediately during business hours     TREATMENT GOALS:   Patient to demonstrate independence with donning/doffing orthosis in one visit, Patient to verbalize understanding of inspecting skin to prevent pressure areas and allergic reaction due to orthosis, Patient to verbalize understanding of precautions and necessity of wearing orthosis as indicated by therapist and Patient to verbalize understanding of wound/pin care in one visit    ALL TREATMENT GOALS MET AT TIME OF EVALUATION:   No: Will review next visit to assure independence    PLAN:   Follow up on a PRN basis

## 2022-06-23 NOTE — PROGRESS NOTES
Orthopaedic Hand Surgery Note    Name: Hunter Mariee  YOB: 2000  Gender: female  MRN: [id number]    Post Operative Visit: Right thumb interphalangeal joint arthrodesis - Right    HPI: Patient is status post Right thumb interphalangeal joint arthrodesis - Right on 6/8/2022. Missed her postop visit earlier this week, but says she was concerned that the thumb may be infected, only because it had been throbbing, she has not had any redness or drainage that she has noticed. Physical Examination:  Surgical incision is clean, dry and intact. Sutures are in place. Scant amount of purulence was able to be expressed after removing the suture on the radial aspect of the incision. There is no surrounding erythema. sensation is intact in all fingers. Motor exam reveals no deficits. .    Imaging:     Finger XR: AP, Lateral, Oblique views     Clinical Indication:  1. Chronic pain of right thumb    2. Degenerative arthritis of interphalangeal joint of right thumb           Report: AP, lateral, oblique x-ray of the right thumb demonstrates headless compression screw in place across the interphalangeal joint    Impression: as above     Adela Radford MD         Assessment:   1. Chronic pain of right thumb    2. Degenerative arthritis of interphalangeal joint of right thumb         Status post Right thumb interphalangeal joint arthrodesis - Right on 6/8/2022    Plan:  We discussed the post operative course and progression. Sutures removed and Steri-Strips were applied today. We will have one of her hand therapist make her a custom splint today which is to be worn at all times except for hygiene. She should perform soap and water washes twice daily, and I will give her a prescription for Keflex.  I will see her back in 1 week for a wound check        Adela Radford MD  06/23/22  11:22 AM

## 2022-06-27 DIAGNOSIS — M79.644 THUMB PAIN, RIGHT: ICD-10-CM

## 2022-06-27 DIAGNOSIS — M15.8 DEGENERATIVE ARTHRITIS OF INTERPHALANGEAL JOINT OF RIGHT THUMB: Primary | ICD-10-CM

## 2022-06-30 ENCOUNTER — OFFICE VISIT (OUTPATIENT)
Dept: ORTHOPEDIC SURGERY | Age: 22
End: 2022-06-30

## 2022-06-30 DIAGNOSIS — M15.8 DEGENERATIVE ARTHRITIS OF INTERPHALANGEAL JOINT OF RIGHT THUMB: Primary | ICD-10-CM

## 2022-06-30 PROCEDURE — 99024 POSTOP FOLLOW-UP VISIT: CPT | Performed by: ORTHOPAEDIC SURGERY

## 2022-06-30 NOTE — PROGRESS NOTES
Orthopaedic Hand Surgery Note    Name: Cal Astudillo  YOB: 2000  Gender: female  MRN: [id number]    Post Operative Visit: Right thumb interphalangeal joint arthrodesis - Right    HPI: Patient is status post Right thumb interphalangeal joint arthrodesis - Right on 6/8/2022. Throbbing and redness has resolved, and she has not noted any drainage. Physical Examination:  Surgical incision is healing well, there is no erythema, tenderness, or drainage. sensation is intact in all fingers. Motor exam reveals no deficits. .    Imaging:     none      Assessment:   1. Degenerative arthritis of interphalangeal joint of right thumb         Status post Right thumb interphalangeal joint arthrodesis - Right on 6/8/2022    Plan:  We discussed the post operative course and progression. Continue soap and water washes and application of gentamicin cream.  She should continue the use of her splint at all times set for hygiene and gentle range of motion exercises.   I will see her back in 4 weeks for repeat radiographs        Zoltan Miranda MD  06/30/22  11:24 AM

## 2022-07-28 ENCOUNTER — OFFICE VISIT (OUTPATIENT)
Dept: ORTHOPEDIC SURGERY | Age: 22
End: 2022-07-28

## 2022-07-28 DIAGNOSIS — M15.8 DEGENERATIVE ARTHRITIS OF INTERPHALANGEAL JOINT OF RIGHT THUMB: Primary | ICD-10-CM

## 2022-07-28 PROCEDURE — 99024 POSTOP FOLLOW-UP VISIT: CPT | Performed by: ORTHOPAEDIC SURGERY

## 2022-07-28 NOTE — PROGRESS NOTES
Orthopaedic Hand Surgery Note    Name: Deni Gomez  YOB: 2000  Gender: female  MRN: [id number]    Post Operative Visit: Right thumb interphalangeal joint arthrodesis - Right    HPI: Patient is status post Right thumb interphalangeal joint arthrodesis - Right on 6/8/2022. Pain is improved, she has not had any redness or swelling. She is able to use the thumb for most ADLs without difficulty. Physical Examination:  Surgical incision is healed well, there is no erythema, tenderness, or drainage. sensation is intact in all fingers. Motor exam reveals no deficits. Mild sensitivity at distal tip of thumb. Imaging:     none      Assessment:   1. Degenerative arthritis of interphalangeal joint of right thumb         Status post Right thumb interphalangeal joint arthrodesis - Right on 6/8/2022    Plan:  We discussed the post operative course and progression. She no longer needs to use a splint. She can perform light pinching and gripping.  I will see her back in 4 weeks for repeat radiographs        Pasquale Grimes MD  07/28/22  11:49 AM

## 2022-10-19 ENCOUNTER — ROUTINE PRENATAL (OUTPATIENT)
Dept: OBGYN CLINIC | Age: 22
End: 2022-10-19
Payer: MEDICAID

## 2022-10-19 VITALS
SYSTOLIC BLOOD PRESSURE: 140 MMHG | BODY MASS INDEX: 41.43 KG/M2 | DIASTOLIC BLOOD PRESSURE: 80 MMHG | HEART RATE: 80 BPM | HEIGHT: 60 IN | WEIGHT: 211 LBS

## 2022-10-19 DIAGNOSIS — Z34.01 PRIMIGRAVIDA, FIRST TRIMESTER: ICD-10-CM

## 2022-10-19 DIAGNOSIS — J45.909 ASTHMA AFFECTING PREGNANCY IN FIRST TRIMESTER: ICD-10-CM

## 2022-10-19 DIAGNOSIS — G89.29 OTHER CHRONIC PAIN: ICD-10-CM

## 2022-10-19 DIAGNOSIS — O10.911 PRE-EXISTING HYPERTENSION AFFECTING PREGNANCY IN FIRST TRIMESTER: ICD-10-CM

## 2022-10-19 DIAGNOSIS — O99.511 ASTHMA AFFECTING PREGNANCY IN FIRST TRIMESTER: ICD-10-CM

## 2022-10-19 DIAGNOSIS — O36.80X0 ENCOUNTER TO DETERMINE FETAL VIABILITY OF PREGNANCY, SINGLE OR UNSPECIFIED FETUS: Primary | ICD-10-CM

## 2022-10-19 PROBLEM — E55.9 VITAMIN D DEFICIENCY: Status: RESOLVED | Noted: 2018-10-12 | Resolved: 2022-10-19

## 2022-10-19 PROBLEM — M15.8 DEGENERATIVE ARTHRITIS OF INTERPHALANGEAL JOINT OF RIGHT THUMB: Status: RESOLVED | Noted: 2022-05-31 | Resolved: 2022-10-19

## 2022-10-19 PROBLEM — H52.13 MYOPIA OF BOTH EYES WITH REGULAR ASTIGMATISM: Status: RESOLVED | Noted: 2017-12-22 | Resolved: 2022-10-19

## 2022-10-19 PROBLEM — H53.8 OTHER VISUAL DISTURBANCES: Status: RESOLVED | Noted: 2017-12-22 | Resolved: 2022-10-19

## 2022-10-19 PROBLEM — H53.023: Status: RESOLVED | Noted: 2017-12-22 | Resolved: 2022-10-19

## 2022-10-19 PROBLEM — H52.223 MYOPIA OF BOTH EYES WITH REGULAR ASTIGMATISM: Status: RESOLVED | Noted: 2017-12-22 | Resolved: 2022-10-19

## 2022-10-19 PROCEDURE — 76801 OB US < 14 WKS SINGLE FETUS: CPT | Performed by: NURSE PRACTITIONER

## 2022-10-19 PROCEDURE — 99214 OFFICE O/P EST MOD 30 MIN: CPT | Performed by: NURSE PRACTITIONER

## 2022-10-19 RX ORDER — ALBUTEROL SULFATE 90 UG/1
AEROSOL, METERED RESPIRATORY (INHALATION)
COMMUNITY
Start: 2022-08-01

## 2022-10-19 RX ORDER — ASPIRIN 81 MG/1
162 TABLET, CHEWABLE ORAL DAILY
Qty: 60 TABLET | Refills: 11 | Status: SHIPPED | OUTPATIENT
Start: 2022-11-04

## 2022-10-19 RX ORDER — LABETALOL 100 MG/1
TABLET, FILM COATED ORAL
COMMUNITY
Start: 2022-09-29

## 2022-10-19 RX ORDER — AMMONIUM LACTATE 12 G/100G
LOTION TOPICAL
COMMUNITY
Start: 2022-07-28

## 2022-10-19 SDOH — ECONOMIC STABILITY: FOOD INSECURITY: WITHIN THE PAST 12 MONTHS, THE FOOD YOU BOUGHT JUST DIDN'T LAST AND YOU DIDN'T HAVE MONEY TO GET MORE.: NEVER TRUE

## 2022-10-19 SDOH — ECONOMIC STABILITY: FOOD INSECURITY: WITHIN THE PAST 12 MONTHS, YOU WORRIED THAT YOUR FOOD WOULD RUN OUT BEFORE YOU GOT MONEY TO BUY MORE.: NEVER TRUE

## 2022-10-19 ASSESSMENT — PATIENT HEALTH QUESTIONNAIRE - PHQ9
SUM OF ALL RESPONSES TO PHQ QUESTIONS 1-9: 0
1. LITTLE INTEREST OR PLEASURE IN DOING THINGS: 0
SUM OF ALL RESPONSES TO PHQ9 QUESTIONS 1 & 2: 0
DEPRESSION UNABLE TO ASSESS: PT REFUSES
2. FEELING DOWN, DEPRESSED OR HOPELESS: 0
SUM OF ALL RESPONSES TO PHQ QUESTIONS 1-9: 0

## 2022-10-19 ASSESSMENT — SOCIAL DETERMINANTS OF HEALTH (SDOH): HOW HARD IS IT FOR YOU TO PAY FOR THE VERY BASICS LIKE FOOD, HOUSING, MEDICAL CARE, AND HEATING?: NOT HARD AT ALL

## 2022-10-19 NOTE — ASSESSMENT & PLAN NOTE
History reviewed  PNV's ordered (if not already taking)  PN labs, UDS ordered  Problem list reviewed  OB physical completed  OB prenatal packet/education reviewed: Information included discusses general pregnancy topics, fetal development, weight gain, nutrition, breastfeeding, risky behaviors, WIC, vaccinations and hospital information. RTO 4 weeks  PTL/labor precautions, [address], and pregnancy warning signs reviewed. Genetic testing - D/W pt at length genetic testing that is recommended by ACOG -- NIPT, Quad screen (for trisomies and NTD), CF, SMA. Brief discussion of these diseases - conditions that may increase risks, etiology, carrier states, fetal effects, treatment options, etc was undertaken. D/W pt that these are screening tests/carrier screening test only and are NOT mandatory. We also discuss false POS/false neg rates. It is her decision whether to have them done and how to proceed with the information afterwards.

## 2022-10-19 NOTE — PROGRESS NOTES
Patient comes in today for initial prenatal visit. No complaints/concerns today. Fetal Movements:  No  Contractions:  No  Vaginal Bleeding:  No  Leaking Fluid:  No  GI/ issues:  No    Drug/Alcohol 4P's Plus Screening    1. Have either of your parents ever had a problem with drugs/alcohol/prescription drugs? Yes  2. Does your partner have a problem with drugs/alcohol/prescription drugs? No  3. In the past, have you ever had a problem with drugs/alcohol/prescription drugs? No  4. Before you were pregnant, in the past month, have you done any drugs, drank any alcohol or abused any prescription drugs? NO  If \"YES\" to any of the above, please give further details:  About     LAST PAP:  Never/ she was afraid too    LAST MAMMO:  N/A    LMP:  08/12/2022    FAMILY HISTORY OF:   Breast Cancer:  No   Ovarian Cancer:  No   Uterine Cancer:  No   Colon Cancer:  No    There were no vitals filed for this visit.      Bebeto Pemberton MA  10/19/22  2:39 PM

## 2022-10-20 ENCOUNTER — TELEPHONE (OUTPATIENT)
Dept: OBGYN CLINIC | Age: 22
End: 2022-10-20

## 2022-10-20 DIAGNOSIS — O10.911 PRE-EXISTING HYPERTENSION AFFECTING PREGNANCY IN FIRST TRIMESTER: ICD-10-CM

## 2022-10-20 DIAGNOSIS — Z34.01 PRIMIGRAVIDA, FIRST TRIMESTER: Primary | ICD-10-CM

## 2022-10-20 DIAGNOSIS — F11.90 OPIOID USE: ICD-10-CM

## 2022-10-20 LAB
ABO + RH BLD: NORMAL
ALBUMIN SERPL-MCNC: 3.7 G/DL (ref 3.5–5)
ALBUMIN/GLOB SERPL: 1.1 {RATIO} (ref 0.4–1.6)
ALP SERPL-CCNC: 75 U/L (ref 50–136)
ALT SERPL-CCNC: 57 U/L (ref 12–65)
AMPHET UR QL SCN: NEGATIVE
ANION GAP SERPL CALC-SCNC: 9 MMOL/L (ref 2–11)
AST SERPL-CCNC: 25 U/L (ref 15–37)
BARBITURATES UR QL SCN: NEGATIVE
BENZODIAZ UR QL: NEGATIVE
BILIRUB SERPL-MCNC: 0.1 MG/DL (ref 0.2–1.1)
BLOOD GROUP ANTIBODIES SERPL: NORMAL
BUN SERPL-MCNC: 10 MG/DL (ref 6–23)
CALCIUM SERPL-MCNC: 9.5 MG/DL (ref 8.3–10.4)
CANNABINOIDS UR QL SCN: NEGATIVE
CHLORIDE SERPL-SCNC: 107 MMOL/L (ref 101–110)
CO2 SERPL-SCNC: 22 MMOL/L (ref 21–32)
COCAINE UR QL SCN: NEGATIVE
CREAT SERPL-MCNC: 0.6 MG/DL (ref 0.6–1)
ERYTHROCYTE [DISTWIDTH] IN BLOOD BY AUTOMATED COUNT: 14.2 % (ref 11.9–14.6)
EST. AVERAGE GLUCOSE BLD GHB EST-MCNC: 105 MG/DL
GLOBULIN SER CALC-MCNC: 3.5 G/DL (ref 2.8–4.5)
GLUCOSE SERPL-MCNC: 90 MG/DL (ref 65–100)
HBA1C MFR BLD: 5.3 % (ref 4.8–5.6)
HCT VFR BLD AUTO: 38.5 % (ref 35.8–46.3)
HGB BLD-MCNC: 12.2 G/DL (ref 11.7–15.4)
HIV 1+2 AB+HIV1 P24 AG SERPL QL IA: NONREACTIVE
HIV 1/2 RESULT COMMENT: NORMAL
LDH SERPL L TO P-CCNC: 170 U/L (ref 100–190)
MCH RBC QN AUTO: 26.6 PG (ref 26.1–32.9)
MCHC RBC AUTO-ENTMCNC: 31.7 G/DL (ref 31.4–35)
MCV RBC AUTO: 84.1 FL (ref 82–102)
METHADONE UR QL: NEGATIVE
NRBC # BLD: 0 K/UL (ref 0–0.2)
OPIATES UR QL: POSITIVE
PCP UR QL: NEGATIVE
PLATELET # BLD AUTO: 323 K/UL (ref 150–450)
PMV BLD AUTO: 10.5 FL (ref 9.4–12.3)
POTASSIUM SERPL-SCNC: 4 MMOL/L (ref 3.5–5.1)
PROT SERPL-MCNC: 7.2 G/DL (ref 6.3–8.2)
RBC # BLD AUTO: 4.58 M/UL (ref 4.05–5.2)
SODIUM SERPL-SCNC: 138 MMOL/L (ref 133–143)
URATE SERPL-MCNC: 4.7 MG/DL (ref 2.6–6)
WBC # BLD AUTO: 9.5 K/UL (ref 4.3–11.1)

## 2022-10-20 RX ORDER — PNV NO.95/FERROUS FUM/FOLIC AC 28MG-0.8MG
1 TABLET ORAL DAILY
Qty: 90 TABLET | Refills: 3 | Status: SHIPPED | OUTPATIENT
Start: 2022-10-20

## 2022-10-20 NOTE — PROGRESS NOTES
HPI    This is a 25 y.o.   at 9w6d for new OB visit. Her Estimated Due Date is 2023, Date entered prior to episode creation            Denies leaking of fluid, vaginal bleeding, or regular contractions. Current Outpatient Medications on File Prior to Visit   Medication Sig Dispense Refill    ammonium lactate (LAC-HYDRIN) 12 % lotion APPLY TO AFFECTED AREA ON INNER THIGHS DAILY AS NEEDED      labetalol (NORMODYNE) 100 MG tablet TAKE 1 TABLET BY MOUTH TWICE A DAY FOR 30 DAYS      albuterol sulfate HFA (PROVENTIL;VENTOLIN;PROAIR) 108 (90 Base) MCG/ACT inhaler INHALE 1 PUFF INTO THE LUNGS EVERY 4 HOURS AS NEEDED      HYDROcodone-acetaminophen (NORCO) 7.5-325 MG per tablet TAKE 1 TABLET BY MOUTH EVERY 8 HOURS FOR 30 DAYS      fluticasone (FLONASE) 50 MCG/ACT nasal spray 2 sprays 2 times daily as needed      Melatonin 5 MG CAPS Take 1 tablet by mouth nightly as needed       omeprazole (PRILOSEC) 40 MG delayed release capsule Take 40 mg by mouth daily      ondansetron (ZOFRAN) 4 MG tablet Take 4 mg by mouth every 8 hours as needed      nystatin (MYCOSTATIN) 627724 UNIT/GM cream  (Patient not taking: Reported on 10/19/2022)       No current facility-administered medications on file prior to visit. Allergies   Allergen Reactions    Latex Other (See Comments)     Per pt-- unsure of rx-- states as child    White Petrolatum Itching    Amoxicillin Nausea And Vomiting           OB History    Para Term  AB Living   1 0 0 0 0 0   SAB IAB Ectopic Molar Multiple Live Births   0 0 0 0 0 0       # 1 - Date: None, Sex: None, Weight: None, GA: None, Delivery: None, Apgar1: None, Apgar5: None, Living: None, Birth Comments: None          Past Medical History:   Diagnosis Date    Allergic rhinitis 8/10/2015    Last Assessment & Plan:  Formatting of this note might be different from the original. Doing well and taking claritin daily and flonase prn. Call if symptoms  worsen. Keep well hydrated.  Anxiety     Asthma     daily inhalers    Boil, breast     under left breast--- tx- per pt last antibiotic 6/8/22-- states area  completely healed    Chronic pain     feet and hands    Degenerative arthritis of interphalangeal joint of right thumb 5/31/2022    Added automatically from request for surgery [id number]    Depression     Epidermolysis bullosa     GERD (gastroesophageal reflux disease)     controlled with med    Hypertension     managed with medication     Meridional amblyopia, bilateral 12/22/2017    Last Assessment & Plan:  Formatting of this note might be different from the original. Sees ophthalmology. Can not afford glasses.  Morbid obesity ([address]     BMI = 43    Myopia of both eyes with regular astigmatism 12/22/2017    Pain of right thumb     Seasonal allergic rhinitis     Vitamin D deficiency        Past Surgical History:   Procedure Laterality Date    GI  age 3    nissen with 2 G-tubes- last tube removed at age 11   [de-identified] HAND SURGERY Right 6/8/2022    Right thumb interphalangeal joint arthrodesis performed by Zenaida Dobbs MD at [address] [place] Left 03/2022    FOOT SURG       No family history on file.     Social History     Socioeconomic History    Marital status: Single     Spouse name: Not on file    Number of children: Not on file    Years of education: Not on file    Highest education level: Not on file   Occupational History    Not on file   Tobacco Use    Smoking status: Never    Smokeless tobacco: Never   Vaping Use    Vaping Use: Some days    Substances: Flavoring    Devices: Disposable   Substance and Sexual Activity    Alcohol use: Never    Drug use: Never    Sexual activity: Yes   Other Topics Concern    Not on file   Social History Narrative    Abuse: Feels safe at home, no history of physical abuse, no history of sexual abuse      Social Determinants of Health     Financial Resource Strain: Low Risk     Difficulty of Paying Living Expenses: Not hard at all   Food Insecurity: No Food Insecurity    Worried About Running Out of Food in the Last Year: Never true    Ran Out of Food in the Last Year: Never true   Transportation Needs: Not on file   Physical Activity: Not on file   Stress: Not on file   Social Connections: Not on file   Intimate Partner Violence: Not on file   Housing Stability: Not on file           Objective        Vitals:    10/19/22 1446   BP: (!) 140/80   Site: Left Upper Arm   Position: Sitting   Cuff Size: Large Adult   Pulse: 80   Weight: 211 lb (95.7 kg)   Height: 5' (1.524 m)           General: well developed, well nourished, in no acute distress    Head: normocephalic and atraumatic    Resp: even and unlabored    Psych: Normal mood and affect        Assessment and Plan    More than 50% of this 30 minute visit was spent counseling the patient on pregnancy expectations. Patient Active Problem List    Diagnosis Date Noted   Miah Farooq, first trimester 10/19/2022     Priority: Medium     Overview Note:     ELIDA 05/19/2023 by LMP c/w 9 wk US       Assessment & Plan Note:     History reviewed  PNV's ordered (if not already taking)  PN labs, UDS ordered  Problem list reviewed  OB physical completed  OB prenatal packet/education reviewed: Information included discusses general pregnancy topics, fetal development, weight gain, nutrition, breastfeeding, risky behaviors, WIC, vaccinations and hospital information. RTO 4 weeks  PTL/labor precautions, [facility], and pregnancy warning signs reviewed. Genetic testing - D/W pt at length genetic testing that is recommended by ACOG -- NIPT, Quad screen (for trisomies and NTD), CF, SMA. Brief discussion of these diseases - conditions that may increase risks, etiology, carrier states, fetal effects, treatment options, etc was undertaken. D/W pt that these are screening tests/carrier screening test only and are NOT mandatory. We also discuss false POS/false neg rates.  It is her decision whether to have them done and how to proceed with the information afterwards.  Pre-existing hypertension affecting pregnancy in first trimester 10/19/2022     Priority: Medium     Overview Note:     10/19/2022: /80, Current dose Labetalol 100 mg PO BID  Plan baseline pre-e labs, to bring 24 hr urine to next visit. Start  mg at 12-36 weeks      Asthma affecting pregnancy in first trimester 10/19/2022     Priority: Medium     Overview Note:     10/19/2022: Using albuterol prn. Advised to notify office with any increase in usage throughout pregnancy      Chronic pain 08/28/2015     Overview Note:     10/19/2022: Pt reports she sees pain clinic for [place] to treat pain related to Epidermolysis bullosa. Pt states she was told by pain clinic that she needed to find a new clinic and was given 1 month refill. Unsure why. Plan for pt to sign AUNG at next visit and we will discuss with MD if transfer to [facility] is appropriate for GUS program            Problem List Items Addressed This Visit        Circulatory    Pre-existing hypertension affecting pregnancy in first trimester       Respiratory    Asthma affecting pregnancy in first trimester    Relevant Medications    albuterol sulfate HFA (PROVENTIL;VENTOLIN;PROAIR) 108 (90 Base) MCG/ACT inhaler       Other    Primigravida, first trimester     History reviewed  PNV's ordered (if not already taking)  PN labs, UDS ordered  Problem list reviewed  OB physical completed  OB prenatal packet/education reviewed: Information included discusses general pregnancy topics, fetal development, weight gain, nutrition, breastfeeding, risky behaviors, WIC, vaccinations and hospital information. RTO 4 weeks  PTL/labor precautions, [facility], and pregnancy warning signs reviewed. Genetic testing - D/W pt at length genetic testing that is recommended by ACOG -- NIPT, Quad screen (for trisomies and NTD), CF, SMA.   Brief discussion of these diseases - conditions that may increase risks, etiology, carrier states, fetal effects, treatment options, etc was undertaken. D/W pt that these are screening tests/carrier screening test only and are NOT mandatory. We also discuss false POS/false neg rates. It is her decision whether to have them done and how to proceed with the information afterwards.                  Relevant Orders    Protein, urine, timed    ABO/Rh (Completed)    Antibody Screen (Completed)    CBC (Completed)    Hemoglobinopathy Evaluation    Hepatitis B Surface Antigen    Hepatitis C Antibody    RPR    HIV 1/2 Ag/Ab, 4TH Generation,W Rflx Confirm    Rubella antibody, IgG    Hemoglobin A1C (Completed)    Urine Drug Screen (Completed)    Comprehensive Metabolic Panel    Lactate Dehydrogenase    Uric Acid    PAP IG, CT-NG-TV, rfx Aptima HPV ASCUS ([id number])    Chronic pain    Relevant Medications    aspirin (ASPIRIN ADULT LOW STRENGTH) 81 MG chewable tablet (Start on 11/4/2022)   Other Visit Diagnoses     Encounter to determine fetal viability of pregnancy, single or unspecified fetus    -  Primary    Relevant Orders    AMB POC US OB < 14 WKS, 1ST GESTATION (Completed)          Orders Placed This Encounter   Procedures    AMB POC US OB < 14 WKS, 1ST GESTATION    Protein, urine, timed    CBC    Hemoglobinopathy Evaluation    Hepatitis B Surface Antigen    Hepatitis C Antibody    RPR    HIV 1/2 Ag/Ab, 4TH Generation,W Rflx Confirm    Rubella antibody, IgG    Hemoglobin A1C    Urine Drug Screen    Comprehensive Metabolic Panel    Lactate Dehydrogenase    Uric Acid    PAP IG, CT-NG-TV, rfx Aptima HPV ASCUS ([id number])    ABO/Rh    Antibody Screen       Outpatient Encounter Medications as of 10/19/2022   Medication Sig Dispense Refill    Prenatal Vit-Fe Fumarate-FA (PRENATAL VITAMINS) 28-0.8 MG TABS Take 1 tablet by mouth daily 90 tablet 3    ammonium lactate (LAC-HYDRIN) 12 % lotion APPLY TO AFFECTED AREA ON INNER THIGHS DAILY AS NEEDED      labetalol (NORMODYNE) 100 MG tablet TAKE 1 TABLET BY MOUTH TWICE A DAY FOR 30 DAYS      albuterol sulfate HFA (PROVENTIL;VENTOLIN;PROAIR) 108 (90 Base) MCG/ACT inhaler INHALE 1 PUFF INTO THE LUNGS EVERY 4 HOURS AS NEEDED      [START ON 11/4/2022] aspirin (ASPIRIN ADULT LOW STRENGTH) 81 MG chewable tablet Take 2 tablets by mouth daily 60 tablet 11    HYDROcodone-acetaminophen (NORCO) 7.5-325 MG per tablet TAKE 1 TABLET BY MOUTH EVERY 8 HOURS FOR 30 DAYS      fluticasone (FLONASE) 50 MCG/ACT nasal spray 2 sprays 2 times daily as needed      Melatonin 5 MG CAPS Take 1 tablet by mouth nightly as needed       omeprazole (PRILOSEC) 40 MG delayed release capsule Take 40 mg by mouth daily      ondansetron (ZOFRAN) 4 MG tablet Take 4 mg by mouth every 8 hours as needed      [DISCONTINUED] amitriptyline (ELAVIL) 10 MG tablet Take 1 tablet by mouth daily      [DISCONTINUED] ondansetron (ZOFRAN-ODT) 4 MG disintegrating tablet 1 TABLET ON THE TONGUE AND ALLOW TO DISSOLVE ORALLY EVERY 12 HOURS 30 DAYS      nystatin (MYCOSTATIN) [id number] UNIT/GM cream  (Patient not taking: Reported on 10/19/2022)      [DISCONTINUED] gabapentin (NEURONTIN) 400 MG capsule TAKE 1 CAPSULE BY MOUTH THREE TIMES A DAY FOR 30 DAYS      [DISCONTINUED] ibuprofen (ADVIL;MOTRIN) 600 MG tablet TAKE 1 TABLET BY MOUTH EVERY 6 HOURS AS NEEDED WITH FOOD      [DISCONTINUED] albuterol sulfate (PROAIR RESPICLICK) 310 (90 Base) MCG/ACT aerosol powder inhalation Inhale 2 puffs into the lungs every 4 hours as needed for Wheezing or Shortness of Breath      [DISCONTINUED] fluticasone-umeclidin-vilant (TRELEGY ELLIPTA) 100-62.5-25 MCG/INH AEPB 1 puff daily (Patient not taking: Reported on 10/19/2022)      [DISCONTINUED] gabapentin (NEURONTIN) 300 MG capsule 3 times daily.       [DISCONTINUED] hydrOXYzine (ATARAX) 25 MG tablet 25 mg 3 times daily      [DISCONTINUED] metoprolol tartrate (LOPRESSOR) 25 MG tablet 25 mg 2 times daily       No

## 2022-10-20 NOTE — PROGRESS NOTES
I have reviewed the patient's visit today including history, exam and assessment by EULA EstesBC. I agree with treatment/plan as above.     Farnaz Saha MD  1:29 PM  10/20/22

## 2022-10-20 NOTE — TELEPHONE ENCOUNTER
Please let pt know I discussed her medical history with Dr Too Liriano and he does recommend transfer to [facility] due to opioid use in pregnancy. Please start referral.    Have pt keep her appt here in 2 weeks for OBV and 24 hr urine drop off, just in case she cannot get an appointment at [facility] before then.

## 2022-10-21 LAB
HBV SURFACE AG SER QL: NONREACTIVE
HCV AB SER QL: NONREACTIVE
RPR SER QL: NONREACTIVE
RUBV IGG SERPL IA-ACNC: 32.2 IU/ML (ref 0–50)

## 2022-10-24 LAB
HGB A MFR BLD: 97.5 % (ref 96.4–98.8)
HGB A2 MFR BLD COLUMN CHROM: 2.5 % (ref 1.8–3.2)
HGB F MFR BLD: 0 % (ref 0–2)
HGB FRACT BLD-IMP: NORMAL
HGB S MFR BLD: 0 %

## 2022-10-24 NOTE — TELEPHONE ENCOUNTER
Patient LM returning call. When I called her back, the call rang several times then went to . KILEY for her to call the office. No details given.  yuriy

## 2022-11-01 ENCOUNTER — TELEPHONE (OUTPATIENT)
Dept: OBGYN CLINIC | Age: 22
End: 2022-11-01

## 2022-11-01 PROBLEM — O23.591 TRICHOMONAL VAGINITIS DURING PREGNANCY IN FIRST TRIMESTER: Status: ACTIVE | Noted: 2022-11-01

## 2022-11-01 PROBLEM — A59.01 TRICHOMONAL VAGINITIS DURING PREGNANCY IN FIRST TRIMESTER: Status: ACTIVE | Noted: 2022-11-01

## 2022-11-01 RX ORDER — METRONIDAZOLE 500 MG/1
500 TABLET ORAL 2 TIMES DAILY
Qty: 14 TABLET | Refills: 0 | Status: SHIPPED | OUTPATIENT
Start: 2022-11-01 | End: 2022-11-08

## 2022-11-01 NOTE — TELEPHONE ENCOUNTER
Patient tested positive for Trichomonas. This is a sexually transmitted infection. This can be treated with the following, if not allergic    Flagyl 500mg PO BID for 7 days, #14, No Refills      Patient needs to notify partner so they can follow up with their PCP or [facility] for testing and/or treatment. If patient is pregnant, we will retest during her pregnancy. If not pregnant, please schedule patient for retest in 3 months. 2. Patient positive for yeast infection.  If having symptoms, can have one of the following for treatment, if not allergic      Terazol 7 Apply 1 applicator nightly for 7 nights, #7, No Refills

## 2022-11-02 ENCOUNTER — TELEPHONE (OUTPATIENT)
Dept: OBGYN CLINIC | Age: 22
End: 2022-11-02

## 2022-11-02 DIAGNOSIS — Z34.01 PRIMIGRAVIDA, FIRST TRIMESTER: ICD-10-CM

## 2022-11-02 LAB — NIPT, EXTERNAL: NORMAL

## 2022-11-14 ENCOUNTER — ROUTINE PRENATAL (OUTPATIENT)
Dept: OBGYN CLINIC | Age: 22
End: 2022-11-14
Payer: MEDICAID

## 2022-11-14 VITALS
DIASTOLIC BLOOD PRESSURE: 84 MMHG | HEIGHT: 60 IN | BODY MASS INDEX: 40.64 KG/M2 | WEIGHT: 207 LBS | SYSTOLIC BLOOD PRESSURE: 124 MMHG

## 2022-11-14 DIAGNOSIS — Z34.01 PRIMIGRAVIDA, FIRST TRIMESTER: ICD-10-CM

## 2022-11-14 DIAGNOSIS — F11.90 OPIOID USE: ICD-10-CM

## 2022-11-14 DIAGNOSIS — O99.511 ASTHMA AFFECTING PREGNANCY IN FIRST TRIMESTER: ICD-10-CM

## 2022-11-14 DIAGNOSIS — O23.591 TRICHOMONAL VAGINITIS DURING PREGNANCY IN FIRST TRIMESTER: Primary | ICD-10-CM

## 2022-11-14 DIAGNOSIS — A59.01 TRICHOMONAL VAGINITIS DURING PREGNANCY IN FIRST TRIMESTER: Primary | ICD-10-CM

## 2022-11-14 DIAGNOSIS — J45.909 ASTHMA AFFECTING PREGNANCY IN FIRST TRIMESTER: ICD-10-CM

## 2022-11-14 DIAGNOSIS — O10.911 PRE-EXISTING HYPERTENSION AFFECTING PREGNANCY IN FIRST TRIMESTER: ICD-10-CM

## 2022-11-14 LAB
COLLECT DURATION TIME UR: 24 HR
PROT 24H UR-MRATE: 104 MG/24HR (ref 40–150)
PROT UR-MCNC: 13 MG/DL
SPECIMEN VOL ?TM UR: 800 ML

## 2022-11-14 PROCEDURE — 99213 OFFICE O/P EST LOW 20 MIN: CPT | Performed by: NURSE PRACTITIONER

## 2022-11-14 NOTE — PROGRESS NOTES
This is a 25 y.o.   at 13w3d for routine OB visit. Her Estimated Due Date is 2023, Date entered prior to episode creation    Denies leaking of fluid, vaginal bleeding, or regular contractions. Reports low appetite. Denies N/V or food aversions. We discuss trying nutrition shakes and pt agreeable. Current Outpatient Medications on File Prior to Visit   Medication Sig Dispense Refill    Prenatal Vit-Fe Fumarate-FA (PRENATAL VITAMINS) 28-0.8 MG TABS Take 1 tablet by mouth daily 90 tablet 3    ammonium lactate (LAC-HYDRIN) 12 % lotion APPLY TO AFFECTED AREA ON INNER THIGHS DAILY AS NEEDED      labetalol (NORMODYNE) 100 MG tablet TAKE 1 TABLET BY MOUTH TWICE A DAY FOR 30 DAYS      albuterol sulfate HFA (PROVENTIL;VENTOLIN;PROAIR) 108 (90 Base) MCG/ACT inhaler INHALE 1 PUFF INTO THE LUNGS EVERY 4 HOURS AS NEEDED      HYDROcodone-acetaminophen (NORCO) 7.5-325 MG per tablet TAKE 1 TABLET BY MOUTH EVERY 8 HOURS FOR 30 DAYS      fluticasone (FLONASE) 50 MCG/ACT nasal spray 2 sprays 2 times daily as needed      Melatonin 5 MG CAPS Take 1 tablet by mouth nightly as needed       omeprazole (PRILOSEC) 40 MG delayed release capsule Take 40 mg by mouth daily      aspirin (ASPIRIN ADULT LOW STRENGTH) 81 MG chewable tablet Take 2 tablets by mouth daily (Patient not taking: Reported on 2022) 60 tablet 11    nystatin (MYCOSTATIN) [id number] UNIT/GM cream  (Patient not taking: No sig reported)       No current facility-administered medications on file prior to visit.        Allergies   Allergen Reactions    Latex Other (See Comments)     Per pt-- unsure of rx-- states as child    White Petrolatum Itching    Amoxicillin Nausea And Vomiting       OB History    Para Term  AB Living   1 0 0 0 0 0   SAB IAB Ectopic Molar Multiple Live Births   0 0 0 0 0 0       # 1 - Date: None, Sex: None, Weight: None, GA: None, Delivery: None, Apgar1: None, Apgar5: None, Living: None, Birth Comments: None        Past Medical History:   Diagnosis Date    Allergic rhinitis 8/10/2015    Last Assessment & Plan:  Formatting of this note might be different from the original. Doing well and taking claritin daily and flonase prn. Call if symptoms  worsen. Keep well hydrated.  Anxiety     Asthma     daily inhalers    Boil, breast     under left breast--- tx- per pt last antibiotic 6/8/22-- states area  completely healed    Chronic pain     feet and hands    Degenerative arthritis of interphalangeal joint of right thumb 5/31/2022    Added automatically from request for surgery [id number]    Depression     Epidermolysis bullosa     GERD (gastroesophageal reflux disease)     controlled with med    Hypertension     managed with medication     Meridional amblyopia, bilateral 12/22/2017    Last Assessment & Plan:  Formatting of this note might be different from the original. Sees ophthalmology. Can not afford glasses.  Morbid obesity ([facility] [address]     BMI = 43    Myopia of both eyes with regular astigmatism 12/22/2017    Pain of right thumb     Seasonal allergic rhinitis     Vitamin D deficiency        Past Surgical History:   Procedure Laterality Date    GI  age 3    nissen with 2 G-tubes- last tube removed at age 11   [de-identified] HAND SURGERY Right 6/8/2022    Right thumb interphalangeal joint arthrodesis performed by Marguerite Matute MD at [address] [place] Left 03/2022    FOOT SURG       History reviewed. No pertinent family history.     Social History     Socioeconomic History    Marital status: Single     Spouse name: Not on file    Number of children: Not on file    Years of education: Not on file    Highest education level: Not on file   Occupational History    Not on file   Tobacco Use    Smoking status: Never    Smokeless tobacco: Never   Vaping Use    Vaping Use: Some days    Substances: Flavoring    Devices: Disposable   Substance and Sexual Activity    Alcohol use: Never    Drug use: Never    Sexual activity: Yes   Other Topics Concern    Not on file   Social History Narrative    Abuse: Feels safe at home, no history of physical abuse, no history of sexual abuse      Social Determinants of Health     Financial Resource Strain: Low Risk     Difficulty of Paying Living Expenses: Not hard at all   Food Insecurity: No Food Insecurity    Worried About Running Out of Food in the Last Year: Never true    [address] [facility] [address] of Food in the Last Year: Never true   Transportation Needs: Not on file   Physical Activity: Not on file   Stress: Not on file   Social Connections: Not on file   Intimate Partner Violence: Not on file   Housing Stability: Not on file           Objective    Vitals:    11/14/22 1457   BP: 124/84   Site: Left Upper Arm   Position: Sitting   Weight: 207 lb (93.9 kg)   Height: 5' (1.524 m)       General: well developed, well nourished, in no acute distress    Head: normocephalic and atraumatic    Resp: even and unlabored    Psych: Normal mood and affect        Assessment and Plan      Patient Active Problem List    Diagnosis Date Noted    Trichomonal vaginitis during pregnancy in first trimester 11/01/2022     Priority: Medium     Overview Note:     Treated 11/1/2022- Plan JULIETTE and repeat STD testing in 3rd trimester      Primigravida, first trimester 10/19/2022     Priority: Medium     Overview Note:     ELIDA 05/19/2023 by LMP c/w 9 wk US    10/19/2022: NIPT neg x3, male, CF/SMA/DMD/Fragile X  negative       Assessment & Plan Note:     PTL/labor precautions, [address] [place], and pregnancy warning signs reviewed. Pt advised to call the office at [phone] or go straight to Labor and Delivery at [address] with any of the following concerns vaginal bleeding, leaking of fluid, poly regularly Q 5-7 minutes for over an hour or not feeling the baby move. Pt to be transferred to [facility] but has not yet heard from them to schedule.  Sherren Conner to followup on referral. Pt did update her address/phone number today with us      Pre-existing hypertension affecting pregnancy in first trimester 10/19/2022     Priority: Medium     Overview Note:     10/19/2022: /80, Current dose Labetalol 100 mg PO BID  Plan baseline pre-e labs, to bring 24 hr urine to next visit. Start  mg at 12-36 weeks    11/14/2022: BP nl. Continue Labetalol 100 mg PO BID. 24 hr dropped off today      Asthma affecting pregnancy in first trimester 10/19/2022     Priority: Medium     Overview Note:     10/19/2022: Using albuterol prn. Advised to notify office with any increase in usage throughout pregnancy       Assessment & Plan Note:     Noted, stable      Opioid use 08/28/2015     Overview Note:     10/19/2022: Pt reports she sees pain clinic for [place] to treat pain related to Epidermolysis bullosa. Pt states she was told by pain clinic that she needed to find a new clinic and was given 1 month refill. Unsure why. Plan for pt to sign AUNG at next visit and we will discuss with MD if transfer to [facility] is appropriate for GUS program    10/20/2022: Referral to sweetie per Dr Stevenson Torres. UDS + opiates. Scripts reviewed. Last rx for Hydrocodone-Acetaminophen 7.5-325 on 8/23/22 by pain mgmt. Assessment & Plan Note:     noted         Problem List Items Addressed This Visit        Circulatory    Pre-existing hypertension affecting pregnancy in first trimester       Respiratory    Asthma affecting pregnancy in first trimester     Noted, stable            Genitourinary    Trichomonal vaginitis during pregnancy in first trimester - Primary       Other    Primigravida, first trimester     PTL/labor precautions, [address], and pregnancy warning signs reviewed. Pt advised to call the office at [phone] or go straight to Labor and Delivery at [address] with any of the following concerns vaginal bleeding, leaking of fluid, poly regularly Q 5-7 minutes for over an hour or not feeling the baby move.    Pt to be transferred to [facility] but has not yet heard from them to schedule. Yo Leeann to followup on referral. Pt did update her address/phone number today with us         Opioid use     noted            No orders of the defined types were placed in this encounter. Outpatient Encounter Medications as of 11/14/2022   Medication Sig Dispense Refill    Prenatal Vit-Fe Fumarate-FA (PRENATAL VITAMINS) 28-0.8 MG TABS Take 1 tablet by mouth daily 90 tablet 3    ammonium lactate (LAC-HYDRIN) 12 % lotion APPLY TO AFFECTED AREA ON INNER THIGHS DAILY AS NEEDED      labetalol (NORMODYNE) 100 MG tablet TAKE 1 TABLET BY MOUTH TWICE A DAY FOR 30 DAYS      albuterol sulfate HFA (PROVENTIL;VENTOLIN;PROAIR) 108 (90 Base) MCG/ACT inhaler INHALE 1 PUFF INTO THE LUNGS EVERY 4 HOURS AS NEEDED      HYDROcodone-acetaminophen (NORCO) 7.5-325 MG per tablet TAKE 1 TABLET BY MOUTH EVERY 8 HOURS FOR 30 DAYS      fluticasone (FLONASE) 50 MCG/ACT nasal spray 2 sprays 2 times daily as needed      Melatonin 5 MG CAPS Take 1 tablet by mouth nightly as needed       omeprazole (PRILOSEC) 40 MG delayed release capsule Take 40 mg by mouth daily      aspirin (ASPIRIN ADULT LOW STRENGTH) 81 MG chewable tablet Take 2 tablets by mouth daily (Patient not taking: Reported on 11/14/2022) 60 tablet 11    nystatin (MYCOSTATIN) [id number] UNIT/GM cream  (Patient not taking: No sig reported)      [DISCONTINUED] ondansetron (ZOFRAN) 4 MG tablet Take 4 mg by mouth every 8 hours as needed (Patient not taking: Reported on 11/14/2022)       No facility-administered encounter medications on file as of 11/14/2022.                Labor signs, pregnancy warning signs, and fetal movement counting reviewed (if applicable)        Kathryn Quesada NP, APRN - CNP 11/14/22 3:26 PM

## 2022-11-14 NOTE — PROGRESS NOTES
Patient comes in today for routine prenatal visit. No complaints/concerns today.      Fetal Movement: No  Contractions: No  Vaginal Bleeding: No  Leaking Fluid: No  GI/: No    Vitals:    11/14/22 1457   BP: 124/84   Site: Left Upper Arm   Position: Sitting   Weight: 207 lb (93.9 kg)   Height: 5' (1.524 m)

## 2022-11-14 NOTE — ASSESSMENT & PLAN NOTE
PTL/labor precautions, [address], and pregnancy warning signs reviewed. Pt advised to call the office at [phone] or go straight to Labor and Delivery at [facility] with any of the following concerns vaginal bleeding, leaking of fluid, poly regularly Q 5-7 minutes for over an hour or not feeling the baby move. Pt to be transferred to [facility] but has not yet heard from them to schedule.  Calin Gomez to followup on referral. Pt did update her address/phone number today with us

## 2022-11-14 NOTE — PROGRESS NOTES
I have reviewed the patient's visit today including history, exam and assessment by NICKIE Hidalgo. I agree with treatment/plan as above.     Glen Collier MD  3:35 PM  11/14/22

## 2022-11-15 ENCOUNTER — TELEPHONE (OUTPATIENT)
Dept: OBGYN CLINIC | Age: 22
End: 2022-11-15

## 2022-11-16 ENCOUNTER — TELEPHONE (OUTPATIENT)
Dept: OBGYN CLINIC | Age: 22
End: 2022-11-16

## 2022-11-16 NOTE — TELEPHONE ENCOUNTER
Pt lvm stating she had wrong number for the OBGYN center she is being transferred to. Called pt back and gave her the number we have in the office which is [phone]. Pt voiced understanding.

## 2022-11-16 NOTE — TELEPHONE ENCOUNTER
Nurse at [facility] office LM stating the fax number was wrong. I corrected the fax number and faxed referral again.  yuriy

## 2022-12-07 ENCOUNTER — TELEPHONE (OUTPATIENT)
Dept: OBGYN CLINIC | Age: 22
End: 2022-12-07

## 2022-12-08 RX ORDER — ONDANSETRON 4 MG/1
4 TABLET, FILM COATED ORAL EVERY 8 HOURS PRN
Qty: 30 TABLET | Refills: 0 | Status: SHIPPED | OUTPATIENT
Start: 2022-12-08

## 2022-12-08 NOTE — TELEPHONE ENCOUNTER
Patient LM returning call. I called patient. She is wanting something for nausea. Per Peri we can send in zofran. Rx pend for Gerald Loges to send.

## 2022-12-17 ENCOUNTER — APPOINTMENT (OUTPATIENT)
Dept: ULTRASOUND IMAGING | Age: 22
End: 2022-12-17
Payer: MEDICAID

## 2022-12-17 ENCOUNTER — HOSPITAL ENCOUNTER (EMERGENCY)
Age: 22
Discharge: HOME | End: 2022-12-17
Attending: STUDENT IN AN ORGANIZED HEALTH CARE EDUCATION/TRAINING PROGRAM
Payer: MEDICAID

## 2022-12-17 VITALS
TEMPERATURE: 98 F | BODY MASS INDEX: 40.64 KG/M2 | WEIGHT: 207 LBS | HEIGHT: 60 IN | HEART RATE: 100 BPM | RESPIRATION RATE: 18 BRPM | OXYGEN SATURATION: 99 % | SYSTOLIC BLOOD PRESSURE: 130 MMHG | DIASTOLIC BLOOD PRESSURE: 87 MMHG

## 2022-12-17 DIAGNOSIS — O23.42 UTI (URINARY TRACT INFECTION) DURING PREGNANCY, SECOND TRIMESTER: ICD-10-CM

## 2022-12-17 DIAGNOSIS — R30.0 DYSURIA: ICD-10-CM

## 2022-12-17 DIAGNOSIS — R10.2 SUPRAPUBIC ABDOMINAL PAIN: Primary | ICD-10-CM

## 2022-12-17 LAB
ALBUMIN SERPL-MCNC: 3.3 G/DL (ref 3.5–5)
ALBUMIN/GLOB SERPL: 0.7 {RATIO} (ref 0.4–1.6)
ALP SERPL-CCNC: 89 U/L (ref 50–136)
ALT SERPL-CCNC: 30 U/L (ref 12–65)
ANION GAP SERPL CALC-SCNC: 4 MMOL/L (ref 2–11)
APPEARANCE UR: ABNORMAL
AST SERPL-CCNC: 13 U/L (ref 15–37)
BACTERIA URNS QL MICRO: ABNORMAL /HPF
BASOPHILS # BLD: 0.1 K/UL (ref 0–0.2)
BASOPHILS NFR BLD: 0 % (ref 0–2)
BILIRUB SERPL-MCNC: 0.3 MG/DL (ref 0.2–1.1)
BILIRUB UR QL: NEGATIVE
BUN SERPL-MCNC: 11 MG/DL (ref 6–23)
CALCIUM SERPL-MCNC: 9.3 MG/DL (ref 8.3–10.4)
CHLORIDE SERPL-SCNC: 108 MMOL/L (ref 101–110)
CO2 SERPL-SCNC: 23 MMOL/L (ref 21–32)
COLOR UR: ABNORMAL
CREAT SERPL-MCNC: 0.7 MG/DL (ref 0.6–1)
DIFFERENTIAL METHOD BLD: ABNORMAL
EOSINOPHIL # BLD: 0 K/UL (ref 0–0.8)
EOSINOPHIL NFR BLD: 0 % (ref 0.5–7.8)
EPI CELLS #/AREA URNS HPF: ABNORMAL /HPF
ERYTHROCYTE [DISTWIDTH] IN BLOOD BY AUTOMATED COUNT: 13.4 % (ref 11.9–14.6)
GLOBULIN SER CALC-MCNC: 4.5 G/DL (ref 2.8–4.5)
GLUCOSE SERPL-MCNC: 104 MG/DL (ref 65–100)
GLUCOSE UR STRIP.AUTO-MCNC: NEGATIVE MG/DL
HCG SERPL-ACNC: ABNORMAL MIU/ML (ref 0–6)
HCT VFR BLD AUTO: 42.2 % (ref 35.8–46.3)
HGB BLD-MCNC: 13.6 G/DL (ref 11.7–15.4)
HGB UR QL STRIP: NEGATIVE
IMM GRANULOCYTES # BLD AUTO: 0.1 K/UL (ref 0–0.5)
IMM GRANULOCYTES NFR BLD AUTO: 0 % (ref 0–5)
KETONES UR QL STRIP.AUTO: ABNORMAL MG/DL
LEUKOCYTE ESTERASE UR QL STRIP.AUTO: ABNORMAL
LYMPHOCYTES # BLD: 0.3 K/UL (ref 0.5–4.6)
LYMPHOCYTES NFR BLD: 3 % (ref 13–44)
MCH RBC QN AUTO: 27.4 PG (ref 26.1–32.9)
MCHC RBC AUTO-ENTMCNC: 32.2 G/DL (ref 31.4–35)
MCV RBC AUTO: 84.9 FL (ref 82–102)
MONOCYTES # BLD: 0.4 K/UL (ref 0.1–1.3)
MONOCYTES NFR BLD: 3 % (ref 4–12)
NEUTS SEG # BLD: 12.5 K/UL (ref 1.7–8.2)
NEUTS SEG NFR BLD: 94 % (ref 43–78)
NITRITE UR QL STRIP.AUTO: NEGATIVE
NRBC # BLD: 0 K/UL (ref 0–0.2)
PH UR STRIP: 6 [PH] (ref 5–9)
PLATELET # BLD AUTO: 216 K/UL (ref 150–450)
PMV BLD AUTO: 9.8 FL (ref 9.4–12.3)
POTASSIUM SERPL-SCNC: 4.3 MMOL/L (ref 3.5–5.1)
PROT SERPL-MCNC: 7.8 G/DL (ref 6.3–8.2)
PROT UR STRIP-MCNC: 30 MG/DL
RBC # BLD AUTO: 4.97 M/UL (ref 4.05–5.2)
RBC #/AREA URNS HPF: ABNORMAL /HPF
SODIUM SERPL-SCNC: 135 MMOL/L (ref 133–143)
SP GR UR REFRACTOMETRY: 1.02 (ref 1–1.02)
UROBILINOGEN UR QL STRIP.AUTO: 1 EU/DL (ref 0.2–1)
WBC # BLD AUTO: 13.4 K/UL (ref 4.3–11.1)
WBC URNS QL MICRO: ABNORMAL /HPF

## 2022-12-17 PROCEDURE — 85025 COMPLETE CBC W/AUTO DIFF WBC: CPT

## 2022-12-17 PROCEDURE — 96374 THER/PROPH/DIAG INJ IV PUSH: CPT

## 2022-12-17 PROCEDURE — 6360000002 HC RX W HCPCS

## 2022-12-17 PROCEDURE — 99284 EMERGENCY DEPT VISIT MOD MDM: CPT

## 2022-12-17 PROCEDURE — 84702 CHORIONIC GONADOTROPIN TEST: CPT

## 2022-12-17 PROCEDURE — 81001 URINALYSIS AUTO W/SCOPE: CPT

## 2022-12-17 PROCEDURE — 96375 TX/PRO/DX INJ NEW DRUG ADDON: CPT

## 2022-12-17 PROCEDURE — 80053 COMPREHEN METABOLIC PANEL: CPT

## 2022-12-17 RX ORDER — ONDANSETRON 4 MG/1
8 TABLET, ORALLY DISINTEGRATING ORAL 3 TIMES DAILY PRN
Qty: 21 TABLET | Refills: 0 | Status: SHIPPED | OUTPATIENT
Start: 2022-12-17

## 2022-12-17 RX ORDER — CEPHALEXIN 500 MG/1
500 CAPSULE ORAL 2 TIMES DAILY
Qty: 14 CAPSULE | Refills: 0 | Status: SHIPPED | OUTPATIENT
Start: 2022-12-17 | End: 2022-12-24

## 2022-12-17 RX ORDER — ONDANSETRON 2 MG/ML
4 INJECTION INTRAMUSCULAR; INTRAVENOUS
Status: COMPLETED | OUTPATIENT
Start: 2022-12-17 | End: 2022-12-17

## 2022-12-17 RX ORDER — METOCLOPRAMIDE HYDROCHLORIDE 5 MG/ML
10 INJECTION INTRAMUSCULAR; INTRAVENOUS ONCE
Status: COMPLETED | OUTPATIENT
Start: 2022-12-17 | End: 2022-12-17

## 2022-12-17 RX ADMIN — METOCLOPRAMIDE HYDROCHLORIDE 10 MG: 5 INJECTION INTRAMUSCULAR; INTRAVENOUS at 14:54

## 2022-12-17 RX ADMIN — ONDANSETRON 4 MG: 2 INJECTION INTRAMUSCULAR; INTRAVENOUS at 13:26

## 2022-12-17 ASSESSMENT — PAIN SCALES - GENERAL: PAINLEVEL_OUTOF10: 5

## 2022-12-17 ASSESSMENT — PAIN DESCRIPTION - LOCATION: LOCATION: ABDOMEN

## 2022-12-17 NOTE — ED TRIAGE NOTES
Patient arrives via POV c/o vomiting that started this morning. She is 18 weeks pregnant. Also c/o abdominal pain. Denies fever, urinary pain, vaginal bleeding or discharge.

## 2022-12-17 NOTE — ED TRIAGE NOTES
80-year-old female presenting to the emergency department chief complaint of vomiting that began this morning. She states she has vomited twice since being in the ED. Reports that she is 18 weeks pregnant. She is also complaining of epigastric and suprapubic abdominal pain. She is denying fever, dysuria, abnormal vaginal bleeding or discharge. Last LMP reported to have been in August.  She is following with obstetrician. On examination there is epigastric and suprapubic tenderness to palpation. Mildly tachycardic 103 bpm.  Other vital stable. Patient evaluated initially in triage. Rapid Medical Evaluation was conducted and necessary orders have been placed. I have performed a medical screening exam.  Care will now be transferred to the provider in the back of the emergency department.   TRINA Maldonado 12:50 PM

## 2022-12-17 NOTE — ED PROVIDER NOTES
[employer] Emergency Department Provider Note                   PCP:                Md Krys James (Inactive)               Age: 25 y.o. Sex: female       ICD-10-CM    1. Suprapubic abdominal pain  R10.2       2. Dysuria  R30.0       3. UTI (urinary tract infection) during pregnancy, second trimester  O23.42           DISPOSITION Decision To Discharge 2022 04:17:02 PM         Orders Placed This Encounter   Procedures    US OB TRANSVAGINAL    CBC with Auto Differential    Comprehensive Metabolic Panel    Urinalysis w rflx microscopic    HCG, Quantitative, Pregnancy        James Shepard is a 25 y.o. female who presents to the Emergency Department with chief complaint of    Chief Complaint   Patient presents with    Abdominal Pain      44-year-old  female presenting to the emergency department with chief complaint of abdominal pain and vomiting. She is 18 weeks pregnant. Reports her vomiting began this morning. She states she has vomited twice since being in the ED. She is also complaining of epigastric and suprapubic abdominal pain. She is denying fever, dysuria, abnormal vaginal bleeding or discharge. Last LMP reported to have been in August.  She is following with obstetrician. The history is provided by the patient. Review of Systems    Past Medical History:   Diagnosis Date    Allergic rhinitis 8/10/2015    Last Assessment & Plan:  Formatting of this note might be different from the original. Doing well and taking claritin daily and flonase prn. Call if symptoms  worsen. Keep well hydrated.      Anxiety     Asthma     daily inhalers    Boil, breast     under left breast--- tx- per pt last antibiotic 22-- states area  completely healed    Chronic pain     feet and hands    Degenerative arthritis of interphalangeal joint of right thumb 2022    Added automatically from request for surgery [id number]    Depression     Epidermolysis bullosa     GERD (gastroesophageal reflux disease) controlled with med    Hypertension     managed with medication     Meridional amblyopia, bilateral 12/22/2017    Last Assessment & Plan:  Formatting of this note might be different from the original. Sees ophthalmology. Can not afford glasses. Morbid obesity ([address]     BMI = 42    Myopia of both eyes with regular astigmatism 12/22/2017    Pain of right thumb     Seasonal allergic rhinitis     Vitamin D deficiency         Past Surgical History:   Procedure Laterality Date    GI  age 3    nissen with 2 G-tubes- last tube removed at age 11    HAND SURGERY Right 6/8/2022    Right thumb interphalangeal joint arthrodesis performed by Janet Ham MD at [facility] Left 03/2022    FOOT SURG        No family history on file.      Social History     Socioeconomic History    Marital status: Single   Tobacco Use    Smoking status: Never    Smokeless tobacco: Never   Vaping Use    Vaping Use: Some days    Substances: Flavoring    Devices: Disposable   Substance and Sexual Activity    Alcohol use: Never    Drug use: Never    Sexual activity: Yes   Social History Narrative    Abuse: Feels safe at home, no history of physical abuse, no history of sexual abuse      Social Determinants of Health     Financial Resource Strain: Low Risk     Difficulty of Paying Living Expenses: Not hard at all   Food Insecurity: No Food Insecurity    Worried About Running Out of Food in the Last Year: Never true    Ran Out of Food in the Last Year: Never true         Latex, White petrolatum, and Amoxicillin     Discharge Medication List as of 12/17/2022  4:31 PM        CONTINUE these medications which have NOT CHANGED    Details   ondansetron (ZOFRAN) 4 MG tablet Take 1 tablet by mouth every 8 hours as needed for Nausea or Vomiting, Disp-30 tablet, R-0Normal      Prenatal Vit-Fe Fumarate-FA (PRENATAL VITAMINS) 28-0.8 MG TABS Take 1 tablet by mouth daily, Disp-90 tablet, R-3Normal      ammonium lactate (LAC-HYDRIN) 12 % lotion APPLY TO AFFECTED AREA ON INNER THIGHS DAILY AS NEEDED, Historical Med      labetalol (NORMODYNE) 100 MG tablet TAKE 1 TABLET BY MOUTH TWICE A DAY FOR 30 DAYSHistorical Med      albuterol sulfate HFA (PROVENTIL;VENTOLIN;PROAIR) 108 (90 Base) MCG/ACT inhaler INHALE 1 PUFF INTO THE LUNGS EVERY 4 HOURS AS NEEDEDHistorical Med      aspirin (ASPIRIN ADULT LOW STRENGTH) 81 MG chewable tablet Take 2 tablets by mouth daily, Disp-60 tablet, R-11Normal      HYDROcodone-acetaminophen (NORCO) 7.5-325 MG per tablet TAKE 1 TABLET BY MOUTH EVERY 8 HOURS FOR 30 DAYSHistorical Med      nystatin (MYCOSTATIN) 640858 UNIT/GM cream Historical Med      fluticasone (FLONASE) 50 MCG/ACT nasal spray 2 sprays 2 times daily as neededHistorical Med      Melatonin 5 MG CAPS Take 1 tablet by mouth nightly as needed Historical Med      omeprazole (PRILOSEC) 40 MG delayed release capsule Take 40 mg by mouth dailyHistorical Med              Vitals signs and nursing note reviewed. Patient Vitals for the past 4 hrs:   Temp Pulse Resp BP SpO2   12/17/22 1632 98 °F (36.7 °C) 100 18 130/87 99 %          Physical Exam  Vitals reviewed. Constitutional:       General: She is not in acute distress. Appearance: Normal appearance. She is normal weight. She is not ill-appearing. HENT:      Head: Normocephalic and atraumatic. Right Ear: Tympanic membrane, ear canal and external ear normal.      Left Ear: Tympanic membrane, ear canal and external ear normal.      Nose: Nose normal. No congestion or rhinorrhea. Mouth/Throat:      Mouth: Mucous membranes are moist.      Pharynx: Oropharynx is clear. No oropharyngeal exudate or posterior oropharyngeal erythema. Eyes:      Extraocular Movements: Extraocular movements intact. Conjunctiva/sclera: Conjunctivae normal.      Pupils: Pupils are equal, round, and reactive to light. Cardiovascular:      Rate and Rhythm: Normal rate and regular rhythm. Pulses: Normal pulses.       Heart sounds: Normal heart sounds. No murmur heard. Pulmonary:      Effort: Pulmonary effort is normal. No respiratory distress. Breath sounds: Normal breath sounds. No stridor. No wheezing or rhonchi. Abdominal:      General: Abdomen is flat. Bowel sounds are normal. There is no distension. Palpations: Abdomen is soft. Tenderness: There is abdominal tenderness in the epigastric area and suprapubic area. There is no guarding or rebound. Musculoskeletal:         General: No swelling, deformity or signs of injury. Normal range of motion. Cervical back: Normal range of motion and neck supple. No rigidity or tenderness. Skin:     General: Skin is warm and dry. Coloration: Skin is not pale. Findings: No erythema, lesion or rash. Neurological:      General: No focal deficit present. Mental Status: She is alert and oriented to person, place, and time. Mental status is at baseline. Psychiatric:         Mood and Affect: Mood normal.         Behavior: Behavior normal.         Thought Content: Thought content normal.         Judgment: Judgment normal.        MDM  Number of Diagnoses or Management Options  Dysuria  Suprapubic abdominal pain  UTI (urinary tract infection) during pregnancy, second trimester  Diagnosis management comments: 66-year-old female presenting to the emergency department with chief complaint of abdominal pain and vomiting. UTI in pregnancy, nausea and vomiting in pregnancy    Patient presenting with 1 day of abdominal pain and vomiting. Denies recent travel or recent antibiotics. Obtain CBC, CMP, urinalysis. Urinalysis indicates 3+ bacteria and leukocyte esterase present. hCG quant [id number]. White blood cell count elevation of 13.4 however suspect that this is secondary to UTI. Prescribed cephalexin for UTI treatment. Outpatient prescription for Zofran also written for as needed nausea relief.   Advised her to follow-up with her obstetrician for ultrasound; she reports to have a follow-up visit this coming Wednesday. I encouraged her to follow-up with that appointment as scheduled. Return precautions given and patient was discharged. Amount and/or Complexity of Data Reviewed  Clinical lab tests: ordered and reviewed    Patient Progress  Patient progress: stable      Procedures      Labs Reviewed   CBC WITH AUTO DIFFERENTIAL - Abnormal; Notable for the following components:       Result Value    WBC 13.4 (*)     Seg Neutrophils 94 (*)     Lymphocytes 3 (*)     Monocytes 3 (*)     Eosinophils % 0 (*)     Segs Absolute 12.5 (*)     Absolute Lymph # 0.3 (*)     All other components within normal limits   COMPREHENSIVE METABOLIC PANEL - Abnormal; Notable for the following components:    Glucose 104 (*)     AST 13 (*)     Albumin 3.3 (*)     All other components within normal limits   URINALYSIS - Abnormal; Notable for the following components:    Specific Gravity, UA 1.025 (*)     Protein, UA 30 (*)     Ketones, Urine TRACE (*)     Leukocyte Esterase, Urine SMALL (*)     BACTERIA, URINE 3+ (*)     All other components within normal limits   HCG, QUANTITATIVE, PREGNANCY - Abnormal; Notable for the following components:    hCG Quant 15,668 (*)     All other components within normal limits        US OB TRANSVAGINAL    (Results Pending)                          Voice dictation software was used during the making of this note. This software is not perfect and grammatical and other typographical errors may be present. This note has not been completely proofread for errors.      TRINA Arauz  12/17/22 1827

## 2022-12-17 NOTE — DISCHARGE SUMMARY
I have reviewed discharge instructions with the patient. The patient verbalized understanding. Patient left ED via Discharge Method: ambulatory to Home with self. Opportunity for questions and clarification provided. Patient given 2 scripts. To continue your aftercare when you leave the hospital, you may receive an automated call from our care team to check in on how you are doing. This is a free service and part of our promise to provide the best care and service to meet your aftercare needs.  If you have questions, or wish to unsubscribe from this service please call [phone]. Thank you for Choosing our [facility] Emergency Department.

## 2022-12-17 NOTE — DISCHARGE INSTRUCTIONS
Your work-up today in the emergency department indicates you have a urinary tract infection. Take the entire course of cephalexin prescribed to you. Additionally I have prescribed you Zofran for as needed nausea relief. Recommendation that you follow-up with your obstetrician for ultrasound imaging.

## 2024-12-14 ENCOUNTER — HOSPITAL ENCOUNTER (EMERGENCY)
Age: 24
Discharge: LEFT BEFORE DISCHARGE | End: 2024-12-14
Payer: MEDICAID

## 2024-12-14 VITALS
HEART RATE: 98 BPM | SYSTOLIC BLOOD PRESSURE: 118 MMHG | RESPIRATION RATE: 16 BRPM | DIASTOLIC BLOOD PRESSURE: 76 MMHG | OXYGEN SATURATION: 96 % | TEMPERATURE: 97.9 F

## 2024-12-14 DIAGNOSIS — R10.9 ABDOMINAL PAIN, UNSPECIFIED ABDOMINAL LOCATION: Primary | ICD-10-CM

## 2024-12-14 LAB
ALBUMIN SERPL-MCNC: 3.8 G/DL (ref 3.5–5)
ALBUMIN/GLOB SERPL: 1 (ref 1–1.9)
ALP SERPL-CCNC: 71 U/L (ref 35–104)
ALT SERPL-CCNC: 11 U/L (ref 8–45)
ANION GAP SERPL CALC-SCNC: 12 MMOL/L (ref 7–16)
AST SERPL-CCNC: 36 U/L (ref 15–37)
BASOPHILS # BLD: 0 K/UL (ref 0–0.2)
BASOPHILS NFR BLD: 0 % (ref 0–2)
BILIRUB SERPL-MCNC: <0.2 MG/DL (ref 0–1.2)
BILIRUB UR QL: NEGATIVE
BUN SERPL-MCNC: 11 MG/DL (ref 6–23)
CALCIUM SERPL-MCNC: 9.3 MG/DL (ref 8.8–10.2)
CHLORIDE SERPL-SCNC: 105 MMOL/L (ref 98–107)
CO2 SERPL-SCNC: 21 MMOL/L (ref 20–29)
CREAT SERPL-MCNC: 0.77 MG/DL (ref 0.6–1.1)
DIFFERENTIAL METHOD BLD: ABNORMAL
EOSINOPHIL # BLD: 0.2 K/UL (ref 0–0.8)
EOSINOPHIL NFR BLD: 2 % (ref 0.5–7.8)
ERYTHROCYTE [DISTWIDTH] IN BLOOD BY AUTOMATED COUNT: 13.7 % (ref 11.9–14.6)
GLOBULIN SER CALC-MCNC: 3.9 G/DL (ref 2.3–3.5)
GLUCOSE SERPL-MCNC: 108 MG/DL (ref 70–99)
GLUCOSE UR QL STRIP.AUTO: NEGATIVE MG/DL
HCG UR QL: NEGATIVE
HCT VFR BLD AUTO: 41.1 % (ref 35.8–46.3)
HGB BLD-MCNC: 12.7 G/DL (ref 11.7–15.4)
IMM GRANULOCYTES # BLD AUTO: 0 K/UL (ref 0–0.5)
IMM GRANULOCYTES NFR BLD AUTO: 0 % (ref 0–5)
KETONES UR-MCNC: NEGATIVE MG/DL
LEUKOCYTE ESTERASE UR QL STRIP: NEGATIVE
LIPASE SERPL-CCNC: 68 U/L (ref 13–60)
LYMPHOCYTES # BLD: 0.2 K/UL (ref 0.5–4.6)
LYMPHOCYTES NFR BLD: 3 % (ref 13–44)
MCH RBC QN AUTO: 25.1 PG (ref 26.1–32.9)
MCHC RBC AUTO-ENTMCNC: 30.9 G/DL (ref 31.4–35)
MCV RBC AUTO: 81.4 FL (ref 82–102)
MONOCYTES # BLD: 0.3 K/UL (ref 0.1–1.3)
MONOCYTES NFR BLD: 3 % (ref 4–12)
NEUTS SEG # BLD: 7.9 K/UL (ref 1.7–8.2)
NEUTS SEG NFR BLD: 92 % (ref 43–78)
NITRITE UR QL: NEGATIVE
NRBC # BLD: 0 K/UL (ref 0–0.2)
PH UR: 5.5 (ref 5–9)
PLATELET # BLD AUTO: 299 K/UL (ref 150–450)
PMV BLD AUTO: 8.9 FL (ref 9.4–12.3)
POTASSIUM SERPL-SCNC: 4.9 MMOL/L (ref 3.5–5.1)
PROT SERPL-MCNC: 7.7 G/DL (ref 6.3–8.2)
PROT UR QL: NEGATIVE MG/DL
RBC # BLD AUTO: 5.05 M/UL (ref 4.05–5.2)
RBC # UR STRIP: NEGATIVE
SERVICE CMNT-IMP: ABNORMAL
SODIUM SERPL-SCNC: 138 MMOL/L (ref 136–145)
SP GR UR: 1.02 (ref 1–1.02)
UROBILINOGEN UR QL: 0.2 EU/DL (ref 0.2–1)
WBC # BLD AUTO: 8.7 K/UL (ref 4.3–11.1)

## 2024-12-14 PROCEDURE — 83690 ASSAY OF LIPASE: CPT

## 2024-12-14 PROCEDURE — 6370000000 HC RX 637 (ALT 250 FOR IP)

## 2024-12-14 PROCEDURE — 99284 EMERGENCY DEPT VISIT MOD MDM: CPT

## 2024-12-14 PROCEDURE — 80053 COMPREHEN METABOLIC PANEL: CPT

## 2024-12-14 PROCEDURE — 81003 URINALYSIS AUTO W/O SCOPE: CPT

## 2024-12-14 PROCEDURE — 81025 URINE PREGNANCY TEST: CPT

## 2024-12-14 PROCEDURE — 85025 COMPLETE CBC W/AUTO DIFF WBC: CPT

## 2024-12-14 PROCEDURE — 6360000002 HC RX W HCPCS

## 2024-12-14 PROCEDURE — 96374 THER/PROPH/DIAG INJ IV PUSH: CPT

## 2024-12-14 RX ORDER — ONDANSETRON 2 MG/ML
4 INJECTION INTRAMUSCULAR; INTRAVENOUS
Status: COMPLETED | OUTPATIENT
Start: 2024-12-14 | End: 2024-12-14

## 2024-12-14 RX ORDER — MAGNESIUM HYDROXIDE/ALUMINUM HYDROXICE/SIMETHICONE 120; 1200; 1200 MG/30ML; MG/30ML; MG/30ML
30 SUSPENSION ORAL
Status: COMPLETED | OUTPATIENT
Start: 2024-12-14 | End: 2024-12-14

## 2024-12-14 RX ORDER — LIDOCAINE HYDROCHLORIDE 20 MG/ML
15 SOLUTION OROPHARYNGEAL
Status: COMPLETED | OUTPATIENT
Start: 2024-12-14 | End: 2024-12-14

## 2024-12-14 RX ADMIN — HYOSCYAMINE SULFATE 0.25 MG: 0.12 TABLET ORAL; SUBLINGUAL at 17:17

## 2024-12-14 RX ADMIN — ALUMINUM HYDROXIDE, MAGNESIUM HYDROXIDE, AND SIMETHICONE 30 ML: 200; 200; 20 SUSPENSION ORAL at 17:14

## 2024-12-14 RX ADMIN — ONDANSETRON 4 MG: 2 INJECTION, SOLUTION INTRAMUSCULAR; INTRAVENOUS at 17:14

## 2024-12-14 RX ADMIN — LIDOCAINE HYDROCHLORIDE 15 ML: 20 SOLUTION ORAL at 17:14

## 2024-12-14 ASSESSMENT — ENCOUNTER SYMPTOMS
DIARRHEA: 0
EYES NEGATIVE: 1
ABDOMINAL PAIN: 1
VOMITING: 1
RESPIRATORY NEGATIVE: 1
ALLERGIC/IMMUNOLOGIC NEGATIVE: 1
NAUSEA: 1

## 2024-12-14 NOTE — ED TRIAGE NOTES
Pt arrives via [facility] from home for complaint of epistaxis as well abdominal pain that started today.

## 2024-12-14 NOTE — ED PROVIDER NOTES
Emergency Department Provider Note       PCP: No primary care provider on file.   Age: 24 y.o.   Sex: female     DISPOSITION Eloped - Left Before Treatment Complete 12/14/2024 06:45:02 PM    ICD-10-CM    1. Abdominal pain, unspecified abdominal location  R10.9           Medical Decision Making     In summary, this is a well-appearing nontoxic 24-year-old female coming in for epigastric pain ongoing for the past couple of hours.  She does report associated nausea and vomiting.  Her abdomen is grossly nontender on examination.  Lab work was obtained.  CBC does not show evidence of leukocytosis although there is associated mild left shift.  H&H stable.  No thrombocytopenia.  Lipase was mildly elevated at 68.  CMP does not show any acute electrolyte abnormalities.  Kidney function normal.  No gross transaminitis.  We were attempting to receive a urine sample from patient when she came up to the desk stating that she had to leave directly after providing this to go  her kids and that she would not be able to stay for results.  We will chart patient has eloped as she will not be here for further results and treatment plan.  Risks of leaving before complete workup was discussed in which she verbalizes understanding.  ED Course as of 12/14/24 1846   Sat Dec 14, 2024   1754 CBC without evidence of leukocytosis.  Mild associated left shift.  H&H stable.  No thrombocytopenia. [TC]   1754 Lipase mildly elevated at 68. [TC]   1754 CMP does not show any acute electro abnormalities.  Kidney function normal.  LFTs normal. [TC]   1842 Patient came up to the desk saying that she would provide the urine sample but she has to leave directly after to go  her kids.  We will elope this patient as she will not be here to receive full workup results and treatment plan. [TC]      ED Course User Index  [TC] Keyur Vasques, SIDNEY Alfonso NP       I independently ordered and reviewed each unique test.    I reviewed external records: ED  (12/21/2022)    Received from [facility]    Financial Resource Strain     Difficulty Paying Living Expenses: Not hard at all     Difficulty Paying Medical Expenses: No   Food Insecurity: No Food Insecurity (12/21/2022)    Received from [facility]    Food Insecurity     Worried about Running Out of Food in the Last Year: Never true     Ran Out of Food in the Last Year: Never true   Transportation Needs: Unmet Transportation Needs (12/21/2022)    Received from [facility]    Transportation Needs     Lack of Transportation: Yes   Physical Activity: Insufficiently Active (12/21/2022)    Received from [facility]    Physical Activity     Days of Exercise per Week: 2     Minutes of Exercise per Session: 0     Total Minutes of Exercise per Week: 0   Stress: No Stress Concern Present (12/21/2022)    Received from [facility]    Stress     Feeling of Stress : Only a little   Social Connections: Unknown (12/26/2023)    Received from [facility]    Social Connections     Frequency of Communication with Friends and Family: Not asked     Frequency of Social Gatherings with Friends and Family: Not asked   Intimate Partner Violence: Unknown (12/26/2023)    Received from [facility]    Intimate Partner Violence     Fear of Current or Ex-Partner: Not asked     Emotionally Abused: Not asked     Physically Abused: Not asked     Sexually Abused: Not asked   Housing Stability: Not At Risk (12/21/2022)    Received from [facility]    Housing Stability     Was there a time when you did not have a steady place to sleep: No     Worried that the place you are staying is making you sick: No        Previous Medications    ALBUTEROL SULFATE HFA (PROVENTIL;VENTOLIN;PROAIR) 108 (90 BASE) MCG/ACT INHALER    INHALE 1 PUFF INTO THE LUNGS EVERY 4 HOURS AS NEEDED    AMMONIUM LACTATE (LAC-HYDRIN) 12 % LOTION    APPLY TO AFFECTED AREA ON INNER THIGHS DAILY AS NEEDED    ASPIRIN (ASPIRIN ADULT LOW STRENGTH) 81 MG CHEWABLE TABLET    Take

## (undated) DEVICE — ZIMMER® STERILE DISPOSABLE TOURNIQUET CUFF WITH PLC, DUAL PORT, SINGLE BLADDER, 18 IN. (46 CM)

## (undated) DEVICE — FOOT & ANKLE SOFT DR WOMACK: Brand: MEDLINE INDUSTRIES, INC.

## (undated) DEVICE — SOLUTION IRRIG 1000ML 09% SOD CHL USP PIC PLAS CONTAINER

## (undated) DEVICE — DRAPE C ARM W54XL84IN MINI FOR OEC 6800

## (undated) DEVICE — Device

## (undated) DEVICE — CANISTER, RIGID, 2000CC: Brand: MEDLINE INDUSTRIES, INC.

## (undated) DEVICE — SPLINT ORTH W3XL15IN PLSTR OF PARIS LO EXOTHERM SMOOTH

## (undated) DEVICE — BANDAGE COMPR W1INXL5YD FOAM COHESIVE QUIK STK SELF ADH SFT

## (undated) DEVICE — SYRINGE IRRIG 60ML SFT PLIABLE BLB EZ TO GRP 1 HND USE W/

## (undated) DEVICE — GLOVE SURG SZ 8 L12IN FNGR THK79MIL GRN LTX FREE

## (undated) DEVICE — DISPOSABLE BIPOLAR CODE, 12' (3.66 M): Brand: CONMED

## (undated) DEVICE — SPONGE GZ W4XL4IN COT 12 PLY TYP VII WVN C FLD DSGN

## (undated) DEVICE — PADDING CAST W3INXL4YD COT BLEND MIC PLEAT UNDERCAST SPEC

## (undated) DEVICE — ZIMMER® STERILE DISPOSABLE TOURNIQUET CUFF WITH PLC, DUAL PORT, SINGLE BLADDER, 30 IN. (76 CM)

## (undated) DEVICE — DRAPE,U/SHT,SPLIT,FILM,60X84,STERILE: Brand: MEDLINE

## (undated) DEVICE — GLOVE SURG SZ 65 L12IN FNGR THK79MIL GRN LTX FREE

## (undated) DEVICE — PADDING CAST W4INXL4YD ST COT COHESIVE HND TEARABLE SPEC

## (undated) DEVICE — BNDG,ELSTC,MATRIX,STRL,2"X5YD,LF,HOOK&LP: Brand: MEDLINE

## (undated) DEVICE — PADDING CAST W2INXL4YD ST COT COHESIVE HND TEARABLE SPEC

## (undated) DEVICE — SUTURE ETHLN SZ 4-0 L18IN NONABSORBABLE BLK L19MM PS-2 3/8 1667H

## (undated) DEVICE — STERILE PVP: Brand: MEDLINE INDUSTRIES, INC.

## (undated) DEVICE — GLOVE SURG SZ 65 THK91MIL LTX FREE SYN POLYISOPRENE

## (undated) DEVICE — HAND PACK: Brand: MEDLINE INDUSTRIES, INC.

## (undated) DEVICE — BANDAGE COBAN 6 IN WND 6INX5YD FOAM

## (undated) DEVICE — SPLINT CAST W4XL30IN WHT THMB FBRGLS PRECUT INTLOK WRINKLE

## (undated) DEVICE — GLOVE ORANGE PI 7 1/2   MSG9075